# Patient Record
Sex: MALE | Race: OTHER | Employment: UNEMPLOYED | URBAN - METROPOLITAN AREA
[De-identification: names, ages, dates, MRNs, and addresses within clinical notes are randomized per-mention and may not be internally consistent; named-entity substitution may affect disease eponyms.]

---

## 2020-03-13 ENCOUNTER — APPOINTMENT (OUTPATIENT)
Dept: GENERAL RADIOLOGY | Age: 40
End: 2020-03-13
Attending: EMERGENCY MEDICINE
Payer: SELF-PAY

## 2020-03-13 ENCOUNTER — HOSPITAL ENCOUNTER (EMERGENCY)
Age: 40
Discharge: HOME | End: 2020-03-13
Attending: EMERGENCY MEDICINE
Payer: SELF-PAY

## 2020-03-13 VITALS
WEIGHT: 219.58 LBS | OXYGEN SATURATION: 97 % | HEART RATE: 67 BPM | BODY MASS INDEX: 38.91 KG/M2 | DIASTOLIC BLOOD PRESSURE: 70 MMHG | HEIGHT: 63 IN | RESPIRATION RATE: 20 BRPM | TEMPERATURE: 98.3 F | SYSTOLIC BLOOD PRESSURE: 118 MMHG

## 2020-03-13 DIAGNOSIS — R42 LIGHTHEADEDNESS: Primary | ICD-10-CM

## 2020-03-13 DIAGNOSIS — F17.210 CIGARETTE NICOTINE DEPENDENCE WITHOUT COMPLICATION: ICD-10-CM

## 2020-03-13 LAB
ALBUMIN SERPL-MCNC: 3.7 G/DL (ref 3.4–5)
ALBUMIN/GLOB SERPL: 1.1 {RATIO} (ref 0.8–1.7)
ALP SERPL-CCNC: 87 U/L (ref 45–117)
ALT SERPL-CCNC: 50 U/L (ref 16–61)
ANION GAP SERPL CALC-SCNC: 6 MMOL/L (ref 3–18)
APPEARANCE UR: CLEAR
AST SERPL-CCNC: 19 U/L (ref 10–38)
ATRIAL RATE: 80 BPM
BASOPHILS # BLD: 0 K/UL (ref 0–0.1)
BASOPHILS NFR BLD: 0 % (ref 0–2)
BILIRUB SERPL-MCNC: 0.4 MG/DL (ref 0.2–1)
BILIRUB UR QL: NEGATIVE
BUN SERPL-MCNC: 17 MG/DL (ref 7–18)
BUN/CREAT SERPL: 13 (ref 12–20)
CALCIUM SERPL-MCNC: 8.8 MG/DL (ref 8.5–10.1)
CALCULATED P AXIS, ECG09: 50 DEGREES
CALCULATED R AXIS, ECG10: 82 DEGREES
CALCULATED T AXIS, ECG11: 38 DEGREES
CHLORIDE SERPL-SCNC: 102 MMOL/L (ref 100–111)
CK MB CFR SERPL CALC: 1.1 % (ref 0–4)
CK MB SERPL-MCNC: 2.4 NG/ML (ref 5–25)
CK SERPL-CCNC: 215 U/L (ref 39–308)
CO2 SERPL-SCNC: 28 MMOL/L (ref 21–32)
COLOR UR: YELLOW
CREAT SERPL-MCNC: 1.27 MG/DL (ref 0.6–1.3)
DIAGNOSIS, 93000: NORMAL
DIFFERENTIAL METHOD BLD: ABNORMAL
EOSINOPHIL # BLD: 0.2 K/UL (ref 0–0.4)
EOSINOPHIL NFR BLD: 2 % (ref 0–5)
ERYTHROCYTE [DISTWIDTH] IN BLOOD BY AUTOMATED COUNT: 12.8 % (ref 11.6–14.5)
GLOBULIN SER CALC-MCNC: 3.3 G/DL (ref 2–4)
GLUCOSE SERPL-MCNC: 146 MG/DL (ref 74–99)
GLUCOSE UR STRIP.AUTO-MCNC: NEGATIVE MG/DL
HCT VFR BLD AUTO: 48.7 % (ref 36–48)
HGB BLD-MCNC: 16.5 G/DL (ref 13–16)
HGB UR QL STRIP: NEGATIVE
KETONES UR QL STRIP.AUTO: NEGATIVE MG/DL
LEUKOCYTE ESTERASE UR QL STRIP.AUTO: NEGATIVE
LYMPHOCYTES # BLD: 1.8 K/UL (ref 0.9–3.6)
LYMPHOCYTES NFR BLD: 17 % (ref 21–52)
MCH RBC QN AUTO: 30.4 PG (ref 24–34)
MCHC RBC AUTO-ENTMCNC: 33.9 G/DL (ref 31–37)
MCV RBC AUTO: 89.9 FL (ref 74–97)
MONOCYTES # BLD: 0.8 K/UL (ref 0.05–1.2)
MONOCYTES NFR BLD: 7 % (ref 3–10)
NEUTS SEG # BLD: 7.9 K/UL (ref 1.8–8)
NEUTS SEG NFR BLD: 74 % (ref 40–73)
NITRITE UR QL STRIP.AUTO: NEGATIVE
P-R INTERVAL, ECG05: 138 MS
PH UR STRIP: 5 [PH] (ref 5–8)
PLATELET # BLD AUTO: 179 K/UL (ref 135–420)
PMV BLD AUTO: 10.4 FL (ref 9.2–11.8)
POTASSIUM SERPL-SCNC: 3.5 MMOL/L (ref 3.5–5.5)
PROT SERPL-MCNC: 7 G/DL (ref 6.4–8.2)
PROT UR STRIP-MCNC: NEGATIVE MG/DL
Q-T INTERVAL, ECG07: 378 MS
QRS DURATION, ECG06: 86 MS
QTC CALCULATION (BEZET), ECG08: 435 MS
RBC # BLD AUTO: 5.42 M/UL (ref 4.7–5.5)
SODIUM SERPL-SCNC: 136 MMOL/L (ref 136–145)
SP GR UR REFRACTOMETRY: 1.03 (ref 1–1.03)
TROPONIN I SERPL-MCNC: <0.02 NG/ML (ref 0–0.04)
UROBILINOGEN UR QL STRIP.AUTO: 1 EU/DL (ref 0.2–1)
VENTRICULAR RATE, ECG03: 80 BPM
WBC # BLD AUTO: 10.6 K/UL (ref 4.6–13.2)

## 2020-03-13 PROCEDURE — 85025 COMPLETE CBC W/AUTO DIFF WBC: CPT

## 2020-03-13 PROCEDURE — 82550 ASSAY OF CK (CPK): CPT

## 2020-03-13 PROCEDURE — 71046 X-RAY EXAM CHEST 2 VIEWS: CPT

## 2020-03-13 PROCEDURE — 74011250636 HC RX REV CODE- 250/636: Performed by: EMERGENCY MEDICINE

## 2020-03-13 PROCEDURE — 81003 URINALYSIS AUTO W/O SCOPE: CPT

## 2020-03-13 PROCEDURE — 99285 EMERGENCY DEPT VISIT HI MDM: CPT

## 2020-03-13 PROCEDURE — 80053 COMPREHEN METABOLIC PANEL: CPT

## 2020-03-13 PROCEDURE — 93005 ELECTROCARDIOGRAM TRACING: CPT

## 2020-03-13 RX ADMIN — SODIUM CHLORIDE 1000 ML: 900 INJECTION, SOLUTION INTRAVENOUS at 02:17

## 2020-03-13 NOTE — DISCHARGE INSTRUCTIONS
You were seen and evaluated in the Emergency Department. Please understand that your work up is not all encompassing and you should follow up with your primary care physician for further management and continuity of care. Please return to Emergency Department or seek medical attention immediately if you have acute worsening in your symptoms or develop chest pain, shortness of breath, repeated vomiting, fever, altered level of consciousness, coughing up blood, or start sweating and feel clammy. If you were prescribed any medicine for home, please take as prescribed by your health-care provider. If you were given any follow-up appointments or numbers to call, please do so as instructed. Avoid any tobacco products or excessive alcohol. Patient Education        Aturdimiento o desmayo: Instrucciones de cuidado  Lightheadedness or Faintness: Care Instructions  Instrucciones de cuidado  El aturdimiento es la sensación de que está a punto de desmayarse o de \"perder el conocimiento\". No se siente jenaro si usted o lo que le rodea se [place]. Es distinto del vértigo, que es la sensación de que usted o las cosas que le rodean dan vueltas o se inclinan. El aturdimiento suele desaparecer o mejorar cuando se acuesta. Si el aturdimiento empeora, esto puede conducir a un desvanecimiento. Es común sentirse aturdido de [employer]. Por lo general, el aturdimiento no se debe a un problema grave. A menudo es causado por ger disminución de corta duración de la presión arterial y el flujo de bhargav hacia la new que se produce al ponerse de pie con demasiada rapidez cuando está acostado o sentado. La atención de seguimiento es ger parte clave de willson tratamiento y seguridad. Asegúrese de hacer y acudir a todas las citas, y llame a willson médico si está teniendo problemas. También es ger buena idea saber los resultados de macrina exámenes y mantener ger lista de los medicamentos que katalina. ¿Cómo puede cuidarse en el hogar?   · Acuéstese jesús 1 o 2 minutos cuando se sienta aturdido. Después de acostarse, siéntese lentamente y permanezca sentado de 1 a 2 minutos antes de ponerse de pie lentamente. · Evite los movimientos, las posturas o las actividades que le hayan producido aturdimiento en el pasado. · Descanse mucho, en especial si está resfriado o tiene gripe, ya que estas pueden causar aturdimiento. · Asegúrese de beber abundante líquido, en especial si tiene fiebre o si ha estado sudando. · No conduzca ni se ponga a sí mismo o ponga a otros en peligro mientras se sienta aturdido. ¿Cuándo debe pedir ayuda? Llame al 911 en cualquier momento que considere que necesita atención de [place]. Por ejemplo, llame si:    · Tiene síntomas de un ataque cerebral. Estos pueden incluir:  ? Entumecimiento, hormigueo, debilidad o parálisis repentinos en la sanchez, el brazo o la pierna, sobre todo si ocurre en un solo lado del cuerpo. ? Cambios súbitos en la vista. ? Problemas repentinos para hablar. ? Confusión súbita o dificultad repentina para comprender frases sencillas. ? Problemas repentinos para caminar o mantener el equilibrio. ? Un dolor de new intenso y repentino, distinto a los carlos de new anteriores.     · Tiene síntomas de un ataque al corazón. Estos podrían incluir:  ? Phineas Corrina en el pecho, o ger sensación extraña en el pecho.  ? Sudoración. ? Falta de aire. ? Náuseas o vómito. ? Dolor, presión o ger sensación extraña en la espalda, el blanquita, la mandíbula, la parte superior del abdomen, o en bettie o ambos hombros o brazos. ? Aturdimiento o debilidad repentina. ? Latidos cardíacos rápidos o irregulares. Cuando llame al  911, es posible que le digan que mastique 1 aspirina para adultos o 2 a 4 aspirinas de dosis baja. Espere a la ambulancia.  No trate de conducir usted mismo un automóvil.    Preste especial atención a los cambios en willson rodger y asegúrese de comunicarse con willson médico si:    · El aturdimiento Mona Tolentino o no mejora con los cuidados en el hogar. ¿Dónde puede encontrar más información en inglés? Vaya a [email or web address]-sharon.[email or web address]  [place] [id number] en la búsqueda para aprender más acerca de \"Aturdimiento o desmayo: Instrucciones de cuidado. \"  Revisado: 2019Versión del contenido: 12.4  © 2442-5467 Healthwise, Incorporated. Las instrucciones de cuidado fueron adaptadas bajo licencia por [employer] (which disclaims liability or warranty for this information). Si usted tiene [place] afección médica o sobre estas instrucciones, siempre pregunte a willson profesional de rodger. Healthwise, Incorporated niega toda garantía o responsabilidad por willson uso de esta información. Patient Education        Mareos: Instrucciones de cuidado  Dizziness: Care Instructions  Instrucciones de cuidado  Los mareos son [employer] sensación de inestabilidad o confusión en la new. Son distintos al vértigo, ger sensación de que la habitación gira o de que usted se mueve o . También es distinto del aturdimiento, que es la sensación de que está a punto de desmayarse. Puede resultar difícil conocer la causa de los [place]. Algunas personas se sienten mareadas cuando tienen migrañas. A veces, los episodios gripales pueden hacer que se sienta mareado. Algunas afecciones médicas, jenaro los problemas cardíacos o la presión arterial yogesh, pueden hacer que se sienta mareado. Muchos medicamentos pueden causar mareos, jenaro los que se usan para la presión arterial yogesh, el dolor o la ansiedad. Si es un medicamento el que está causando los síntomas, willson médico podría recomendarle que lo cambie o deje de tomarlo. Si es un problema cardíaco, podría necesitar medicamentos para ayudar a que willson corazón funcione mejor. Si no hay razón aparente para los síntomas, willson médico podría sugerir vigilar y esperar jesús un tiempo para denis si los mareos desaparecen por sí solos.   La atención de seguimiento es ger parte clave de willson tratamiento y seguridad. Asegúrese de hacer y acudir a todas las citas, y llame a willson médico si está teniendo problemas. También es ger buena idea saber los resultados de macrina exámenes y mantener ger lista de los medicamentos que katalina. ¿Cómo puede cuidarse en el hogar? · Si willson médico le recomienda o receta medicamentos, tómelos exactamente según las indicaciones. Llame a willsno médico si klaus estar teniendo un problema con willson medicamento. · No conduzca mientras se sienta mareado. · Trate de prevenir las caídas. Algunas medidas que puede che son:  ? Usar tapetes antideslizantes, agregar agarraderas cerca de la trisha y usar luces nocturnas. ? Ordenar willson casa de robb manera que en los senderos no haya nada con lo que se pueda tropezar. ? Avisarles a familiares y [facility] que se ha estado sintiendo [place]. [place] les servirá para saber cómo ayudarle. ¿Cuándo debe pedir ayuda? Llame al 911 en cualquier momento que considere que necesita atención de [place]. Por ejemplo, llame si:    · Se desmayó (perdió el conocimiento).     · Tiene mareos junto con síntomas de un ataque cardíaco. Estos pueden incluir:  ? Dolor de [place], o presión o ger sensación extraña en el pecho.  ? Sudoración. ? Falta de aire. ? Náuseas o vómito. ? Dolor, presión, o ger sensación extraña en la espalda, el blanquita, la mandíbula o el abdomen superior, o en bettie o ambos hombros o brazos. ? Aturdimiento o debilidad repentina. ? Un latido cardíaco rápido o irregular.     · Tiene síntomas de un ataque cerebral. Estos pueden incluir:  ? Entumecimiento, hormigueo, debilidad o parálisis repentinos en la sanchez, el brazo o la pierna, sobre todo si ocurre en un solo lado del cuerpo. ? Cambios súbitos en la vista. ? Problemas repentinos para hablar. ? Confusión súbita o dificultad repentina para comprender frases sencillas. ? Problemas repentinos para caminar o mantener el equilibrio. ?  Un dolor de new intenso y repentino, distinto a los carlos de new anteriores.    Llame a iwllson médico ahora mismo o busque atención médica inmediata si:    · Se siente mareado y tiene fiebre, dolor de new o zumbido en los oídos.     · Tiene nuevas náuseas y vómito o estos aumentan.     · Ericka mareos no desaparecen o regresan.    Preste especial atención a los cambios en willson rodger y asegúrese de comunicarse con willson médico si:    · No mejora jenaro se esperaba. ¿Dónde puede encontrar más información en inglés? Vaya a [email or web address]-sharon.[email or web address]  [place] [id number] en la búsqueda para aprender más acerca de \"Mareos: Instrucciones de cuidado. \"  Revisado: 26 junio, 2019Versión del contenido: 12.4  © 9993-5165 Healthwise, Incorporated. Las instrucciones de cuidado fueron adaptadas bajo licencia por [employer] (which disclaims liability or warranty for this information). Si usted tiene [place] afección médica o sobre estas instrucciones, siempre pregunte a willson profesional de rodger. Healthwise, Incorporated niega toda garantía o responsabilidad por willson uso de esta información.

## 2020-03-13 NOTE — ED PROVIDER NOTES
EMERGENCY DEPARTMENT HISTORY AND PHYSICAL EXAM    Date: 3/13/2020  Patient Name: Perla Pineda    History of Presenting Illness     Chief Complaint   Patient presents with    Dizziness         History Provided By: Patient and telephone interpretor    Additional History (Context):   Perla Pineda is a 46 y.o. male with PMHX nicotine dependence presents to the emergency department C/O lightheadedness, shortness of breath while laying and watching TV. Patient also complaining of feeling tremulous. History is translated via  phone. Pt denies chest pain, abdominal pain, vertigo, numbness, or vision changes, and any other sxs or complaints. Denies any alcohol or drug use. PCP: None        Past History     Past Medical History:  History reviewed. No pertinent past medical history. Past Surgical History:  History reviewed. No pertinent surgical history. Family History:  History reviewed. No pertinent family history. Social History:  Social History     Tobacco Use    Smoking status: Current Every Day Smoker    Smokeless tobacco: Never Used   Substance Use Topics    Alcohol use: Never     Frequency: Never    Drug use: Not Currently       Allergies:  No Known Allergies      Review of Systems   Review of Systems   Constitutional: Negative for chills and fever. HENT: Negative for congestion, ear pain, sinus pain and sore throat. Eyes: Negative for pain and visual disturbance. Respiratory: Positive for shortness of breath. Negative for cough. Cardiovascular: Negative for chest pain and leg swelling. Gastrointestinal: Negative for abdominal pain, constipation, diarrhea, nausea and vomiting. Genitourinary: Negative for dysuria and hematuria. Musculoskeletal: Negative for back pain and neck pain. Skin: Negative for pallor and rash. Neurological: Positive for tremors and light-headedness. Negative for dizziness, weakness and headaches.    All other systems reviewed and are negative. Physical Exam     Vitals:    03/13/20 0206 03/13/20 0213 03/13/20 0250 03/13/20 0325   BP: 118/70      Pulse: 74  68 67   Resp: 19  17 20   Temp:       SpO2: 96% 100% 96% 97%   Weight:       Height:         Physical Exam    Nursing note and vitals reviewed    Constitutional: Young  male, no acute distress  Head: Normocephalic, Atraumatic  Eyes: Pupils are equal, round, and reactive to light, EOMI  Neck: Supple, non-tender  Cardiovascular: Regular rate and rhythm, no murmurs, rubs, or gallops  Chest: Normal work of breathing and chest excursion bilaterally  Lungs: Clear to ausculation bilaterally, no wheezes, no rhonchi  Abdomen: Soft, non tender, non distended, normoactive bowel sounds  Back: No evidence of trauma or deformity  Extremities: No evidence of trauma or deformity, no LE edema.  No streaking erythema, vesicular lesions, ulcerations or bulla  Skin: Warm and dry, normal cap refill  Neuro: Alert and appropriate, CN intact, normal speech, moving all 4 extremities freely and symmetrically  Psychiatric: Normal mood and affect       Diagnostic Study Results     Labs -     Recent Results (from the past 12 hour(s))   EKG, 12 LEAD, INITIAL    Collection Time: 03/13/20  1:59 AM   Result Value Ref Range    Ventricular Rate 80 BPM    Atrial Rate 80 BPM    P-R Interval 138 ms    QRS Duration 86 ms    Q-T Interval 378 ms    QTC Calculation (Bezet) 435 ms    Calculated P Axis 50 degrees    Calculated R Axis 82 degrees    Calculated T Axis 38 degrees    Diagnosis       Normal sinus rhythm  Septal infarct , age undetermined  Abnormal ECG  No previous ECGs available     CBC WITH AUTOMATED DIFF    Collection Time: 03/13/20  2:05 AM   Result Value Ref Range    WBC 10.6 4.6 - 13.2 K/uL    RBC 5.42 4.70 - 5.50 M/uL    HGB 16.5 (H) 13.0 - 16.0 g/dL    HCT 48.7 (H) 36.0 - 48.0 %    MCV 89.9 74.0 - 97.0 FL    MCH 30.4 24.0 - 34.0 PG    MCHC 33.9 31.0 - 37.0 g/dL    RDW 12.8 11.6 - 14.5 %    PLATELET 605 936 - 420 K/uL    MPV 10.4 9.2 - 11.8 FL    NEUTROPHILS 74 (H) 40 - 73 %    LYMPHOCYTES 17 (L) 21 - 52 %    MONOCYTES 7 3 - 10 %    EOSINOPHILS 2 0 - 5 %    BASOPHILS 0 0 - 2 %    ABS. NEUTROPHILS 7.9 1.8 - 8.0 K/UL    ABS. LYMPHOCYTES 1.8 0.9 - 3.6 K/UL    ABS. MONOCYTES 0.8 0.05 - 1.2 K/UL    ABS. EOSINOPHILS 0.2 0.0 - 0.4 K/UL    ABS. BASOPHILS 0.0 0.0 - 0.1 K/UL    DF AUTOMATED     METABOLIC PANEL, COMPREHENSIVE    Collection Time: 03/13/20  2:05 AM   Result Value Ref Range    Sodium 136 136 - 145 mmol/L    Potassium 3.5 3.5 - 5.5 mmol/L    Chloride 102 100 - 111 mmol/L    CO2 28 21 - 32 mmol/L    Anion gap 6 3.0 - 18 mmol/L    Glucose 146 (H) 74 - 99 mg/dL    BUN 17 7.0 - 18 MG/DL    Creatinine 1.27 0.6 - 1.3 MG/DL    BUN/Creatinine ratio 13 12 - 20      GFR est AA >60 >60 ml/min/1.73m2    GFR est non-AA >60 >60 ml/min/1.73m2    Calcium 8.8 8.5 - 10.1 MG/DL    Bilirubin, total 0.4 0.2 - 1.0 MG/DL    ALT (SGPT) 50 16 - 61 U/L    AST (SGOT) 19 10 - 38 U/L    Alk.  phosphatase 87 45 - 117 U/L    Protein, total 7.0 6.4 - 8.2 g/dL    Albumin 3.7 3.4 - 5.0 g/dL    Globulin 3.3 2.0 - 4.0 g/dL    A-G Ratio 1.1 0.8 - 1.7     CARDIAC PANEL,(CK, CKMB & TROPONIN)    Collection Time: 03/13/20  2:05 AM   Result Value Ref Range     39 - 308 U/L    CK - MB 2.4 <3.6 ng/ml    CK-MB Index 1.1 0.0 - 4.0 %    Troponin-I, QT <0.02 0.0 - 0.045 NG/ML   URINALYSIS W/ RFLX MICROSCOPIC    Collection Time: 03/13/20  2:38 AM   Result Value Ref Range    Color YELLOW      Appearance CLEAR      Specific gravity 1.028 1.005 - 1.030      pH (UA) 5.0 5.0 - 8.0      Protein NEGATIVE  NEG mg/dL    Glucose NEGATIVE  NEG mg/dL    Ketone NEGATIVE  NEG mg/dL    Bilirubin NEGATIVE  NEG      Blood NEGATIVE  NEG      Urobilinogen 1.0 0.2 - 1.0 EU/dL    Nitrites NEGATIVE  NEG      Leukocyte Esterase NEGATIVE  NEG         Radiologic Studies -   XR CHEST PA LAT   Final Result   IMPRESSION: Mildly diminished lung volumes without acute cardiopulmonary findings. CT Results  (Last 48 hours)    None        CXR Results  (Last 48 hours)               03/13/20 0308  XR CHEST PA LAT Final result    Impression:  IMPRESSION: Mildly diminished lung volumes without acute cardiopulmonary   findings. Narrative:  EXAM: Chest radiographs       CLINICAL INDICATION/HISTORY: Dizziness     > Additional: None. COMPARISON: None       TECHNIQUE: PA and lateral views of the chest       _______________       FINDINGS:       HEART AND MEDIASTINUM: Cardiac silhouette is normal in size. Normal mediastinal   contours . Normal pulmonary vasculature. LUNGS AND PLEURAL SPACES: Mildly diminished lung volumes resultant central   bronchovascular crowding. No confluent airspace opacity. Sharp costophrenic   sulci. No pneumothoraces. BONY THORAX AND SOFT TISSUES: Unremarkable.       _______________                   Medical Decision Making   I am the first provider for this patient. I reviewed the vital signs, available nursing notes, past medical history, past surgical history, family history and social history. Vital Signs-Reviewed the patient's vital signs. Pulse Oximetry Analysis -100 % on room air    Cardiac Monitor:  Rate: 87 bpm  Rhythm: Regular    EKG interpretation: (Preliminary)  1:53 AM   Normal sinus rhythm at 80 bpm.   ms. No acute ST elevation    Records Reviewed: Nursing Notes and Old Medical Records    Provider Notes:   46 y.o. male presenting with lightheadedness. On exam patient is afebrile with appropriate vital signs. He does not appear acutely ill or in acute distress. Patient able to ambulate with a steady gait, no focal neurological deficits. Patient's history is not consistent with acute vertigo. Patient's history are consistent with lightheadedness, near fainting episode. EKG showed no acute ST changes, T wave abnormalities, normal CO, QRS QT intervals, no changes concerning for HOCM and [place].  We will evaluate for other causes of syncope and order a CBC, CMP. If negative, pt is safe for discharge home and outpatient f/u per [facility]. The patient does not have any of the following risk factors for the [facility] (SFSR):   C - History of congestive heart failure  H - Hematocrit < 30%  E - Abnormal ECG  S - Shortness of breath  S - Triage systolic blood pressure < 90       procedures:  Procedures    ED Course:   1:54 AM   Initial assessment performed. The patients presenting problems have been discussed, and they are in agreement with the care plan formulated and outlined with them. I have encouraged them to ask questions as they arise throughout their visit. SMOKING CESSATION:  The patient was counseled on the dangers of tobacco use, and was advised to quit. Reviewed strategies to maximize success, including removing cigarettes and smoking materials from environment. Discussion took 3-5 minutes, and pt expressed understanding. 3:43 AM  Patient with no leukocytosis or bandemia. No metabolic derangements. Cardiac enzymes negative. Discussed strict return precautions and close PCP follow-up for persistent symptoms. Diagnosis and Disposition       DISCHARGE NOTE:  9:55 AM    Lucero Kendrick  results have been reviewed with him. He has been counseled regarding his diagnosis, treatment, and plan. He verbally conveys understanding and agreement of the signs, symptoms, diagnosis, treatment and prognosis and additionally agrees to follow up as discussed. He also agrees with the care-plan and conveys that all of his questions have been answered. I have also provided discharge instructions for him that include: educational information regarding their diagnosis and treatment, and list of reasons why they would want to return to the ED prior to their follow-up appointment, should his condition change. He has been provided with education for proper emergency department utilization. CLINICAL IMPRESSION:    1. Lightheadedness    2. Cigarette nicotine dependence without complication        PLAN:  1. D/C Home  2. There are no discharge medications for this patient. 3.   Follow-up Information     Follow up With Specialties Details Why Contact Info    [id number] [facility]  Schedule an appointment as soon as possible for a visit in 2 days  [id number] [facility], [address] [facility] [address]    [facility] EMERGENCY DEPT Emergency Medicine  As needed if symptoms worsen [address] [address]  [phone]        ____________________________________     Please note that this dictation was completed with [employer], the computer voice recognition software. Quite often unanticipated grammatical, syntax, homophones, and other interpretive errors are inadvertently transcribed by the computer software. Please disregard these errors. Please excuse any errors that have escaped final proofreading.

## 2020-03-13 NOTE — ED TRIAGE NOTES
Patient arrives ambulatory to ED with c/c of feeling dizzy like the room is spinning and everything looks distorted, onset 1 hour pta. Patient is Sami speaking only.

## 2020-08-21 ENCOUNTER — HOSPITAL ENCOUNTER (INPATIENT)
Age: 40
LOS: 2 days | Discharge: HOME | DRG: 069 | End: 2020-08-23
Attending: EMERGENCY MEDICINE | Admitting: FAMILY MEDICINE

## 2020-08-21 ENCOUNTER — APPOINTMENT (OUTPATIENT)
Dept: CT IMAGING | Age: 40
DRG: 069 | End: 2020-08-21
Attending: EMERGENCY MEDICINE

## 2020-08-21 ENCOUNTER — APPOINTMENT (OUTPATIENT)
Dept: MRI IMAGING | Age: 40
DRG: 069 | End: 2020-08-21
Attending: EMERGENCY MEDICINE

## 2020-08-21 DIAGNOSIS — G45.9 TIA (TRANSIENT ISCHEMIC ATTACK): Primary | ICD-10-CM

## 2020-08-21 LAB
ALBUMIN SERPL-MCNC: 4.1 G/DL (ref 3.4–5)
ALBUMIN/GLOB SERPL: 1.2 {RATIO} (ref 0.8–1.7)
ALP SERPL-CCNC: 64 U/L (ref 45–117)
ALT SERPL-CCNC: 39 U/L (ref 16–61)
AMPHET UR QL SCN: NEGATIVE
ANION GAP SERPL CALC-SCNC: 6 MMOL/L (ref 3–18)
AST SERPL-CCNC: 18 U/L (ref 10–38)
BARBITURATES UR QL SCN: NEGATIVE
BASOPHILS # BLD: 0 K/UL (ref 0–0.1)
BASOPHILS NFR BLD: 0 % (ref 0–2)
BENZODIAZ UR QL: NEGATIVE
BILIRUB SERPL-MCNC: 0.6 MG/DL (ref 0.2–1)
BUN SERPL-MCNC: 18 MG/DL (ref 7–18)
BUN/CREAT SERPL: 18 (ref 12–20)
CALCIUM SERPL-MCNC: 9.2 MG/DL (ref 8.5–10.1)
CANNABINOIDS UR QL SCN: POSITIVE
CHLORIDE SERPL-SCNC: 106 MMOL/L (ref 100–111)
CK MB CFR SERPL CALC: 1.2 % (ref 0–4)
CK MB SERPL-MCNC: 2.7 NG/ML (ref 5–25)
CK SERPL-CCNC: 232 U/L (ref 39–308)
CO2 SERPL-SCNC: 28 MMOL/L (ref 21–32)
COCAINE UR QL SCN: POSITIVE
CREAT SERPL-MCNC: 0.98 MG/DL (ref 0.6–1.3)
DIFFERENTIAL METHOD BLD: ABNORMAL
EOSINOPHIL # BLD: 0.2 K/UL (ref 0–0.4)
EOSINOPHIL NFR BLD: 2 % (ref 0–5)
ERYTHROCYTE [DISTWIDTH] IN BLOOD BY AUTOMATED COUNT: 12.9 % (ref 11.6–14.5)
GLOBULIN SER CALC-MCNC: 3.4 G/DL (ref 2–4)
GLUCOSE BLD STRIP.AUTO-MCNC: 104 MG/DL (ref 70–110)
GLUCOSE BLD STRIP.AUTO-MCNC: 243 MG/DL (ref 70–110)
GLUCOSE SERPL-MCNC: 102 MG/DL (ref 74–99)
HCT VFR BLD AUTO: 49.3 % (ref 36–48)
HDSCOM,HDSCOM: ABNORMAL
HGB BLD-MCNC: 17.1 G/DL (ref 13–16)
LYMPHOCYTES # BLD: 1.8 K/UL (ref 0.9–3.6)
LYMPHOCYTES NFR BLD: 20 % (ref 21–52)
MCH RBC QN AUTO: 30.9 PG (ref 24–34)
MCHC RBC AUTO-ENTMCNC: 34.7 G/DL (ref 31–37)
MCV RBC AUTO: 89 FL (ref 74–97)
METHADONE UR QL: NEGATIVE
MONOCYTES # BLD: 0.4 K/UL (ref 0.05–1.2)
MONOCYTES NFR BLD: 5 % (ref 3–10)
NEUTS SEG # BLD: 6.3 K/UL (ref 1.8–8)
NEUTS SEG NFR BLD: 73 % (ref 40–73)
OPIATES UR QL: NEGATIVE
PCP UR QL: NEGATIVE
PLATELET # BLD AUTO: 177 K/UL (ref 135–420)
PMV BLD AUTO: 10.1 FL (ref 9.2–11.8)
POTASSIUM SERPL-SCNC: 4.2 MMOL/L (ref 3.5–5.5)
PROT SERPL-MCNC: 7.5 G/DL (ref 6.4–8.2)
RBC # BLD AUTO: 5.54 M/UL (ref 4.7–5.5)
SODIUM SERPL-SCNC: 140 MMOL/L (ref 136–145)
TROPONIN I SERPL-MCNC: <0.02 NG/ML (ref 0–0.04)
WBC # BLD AUTO: 8.8 K/UL (ref 4.6–13.2)

## 2020-08-21 PROCEDURE — 82962 GLUCOSE BLOOD TEST: CPT

## 2020-08-21 PROCEDURE — 65660000000 HC RM CCU STEPDOWN

## 2020-08-21 PROCEDURE — 74011250637 HC RX REV CODE- 250/637: Performed by: EMERGENCY MEDICINE

## 2020-08-21 PROCEDURE — 82550 ASSAY OF CK (CPK): CPT

## 2020-08-21 PROCEDURE — 93005 ELECTROCARDIOGRAM TRACING: CPT

## 2020-08-21 PROCEDURE — 74011250637 HC RX REV CODE- 250/637: Performed by: FAMILY MEDICINE

## 2020-08-21 PROCEDURE — 80053 COMPREHEN METABOLIC PANEL: CPT

## 2020-08-21 PROCEDURE — 99285 EMERGENCY DEPT VISIT HI MDM: CPT

## 2020-08-21 PROCEDURE — 74011250636 HC RX REV CODE- 250/636: Performed by: FAMILY MEDICINE

## 2020-08-21 PROCEDURE — 70551 MRI BRAIN STEM W/O DYE: CPT

## 2020-08-21 PROCEDURE — 80307 DRUG TEST PRSMV CHEM ANLYZR: CPT

## 2020-08-21 PROCEDURE — 70450 CT HEAD/BRAIN W/O DYE: CPT

## 2020-08-21 PROCEDURE — 85025 COMPLETE CBC W/AUTO DIFF WBC: CPT

## 2020-08-21 RX ORDER — ATORVASTATIN CALCIUM 20 MG/1
80 TABLET, FILM COATED ORAL
Status: DISCONTINUED | OUTPATIENT
Start: 2020-08-21 | End: 2020-08-23

## 2020-08-21 RX ORDER — IBUPROFEN 200 MG
1 TABLET ORAL DAILY
Status: DISCONTINUED | OUTPATIENT
Start: 2020-08-22 | End: 2020-08-23 | Stop reason: HOSPADM

## 2020-08-21 RX ORDER — ONDANSETRON 2 MG/ML
4 INJECTION INTRAMUSCULAR; INTRAVENOUS
Status: DISCONTINUED | OUTPATIENT
Start: 2020-08-21 | End: 2020-08-23 | Stop reason: HOSPADM

## 2020-08-21 RX ORDER — GUAIFENESIN 100 MG/5ML
81 LIQUID (ML) ORAL DAILY
Status: DISCONTINUED | OUTPATIENT
Start: 2020-08-22 | End: 2020-08-23 | Stop reason: HOSPADM

## 2020-08-21 RX ORDER — ASPIRIN 325 MG
325 TABLET ORAL
Status: COMPLETED | OUTPATIENT
Start: 2020-08-21 | End: 2020-08-21

## 2020-08-21 RX ORDER — BISACODYL 5 MG
5 TABLET, DELAYED RELEASE (ENTERIC COATED) ORAL DAILY PRN
Status: DISCONTINUED | OUTPATIENT
Start: 2020-08-21 | End: 2020-08-23 | Stop reason: HOSPADM

## 2020-08-21 RX ORDER — ACETAMINOPHEN 325 MG/1
650 TABLET ORAL
Status: DISCONTINUED | OUTPATIENT
Start: 2020-08-21 | End: 2020-08-23 | Stop reason: HOSPADM

## 2020-08-21 RX ORDER — FAMOTIDINE 20 MG/1
20 TABLET, FILM COATED ORAL EVERY 12 HOURS
Status: DISCONTINUED | OUTPATIENT
Start: 2020-08-21 | End: 2020-08-23 | Stop reason: HOSPADM

## 2020-08-21 RX ORDER — LABETALOL HCL 20 MG/4 ML
5 SYRINGE (ML) INTRAVENOUS
Status: DISCONTINUED | OUTPATIENT
Start: 2020-08-21 | End: 2020-08-23 | Stop reason: HOSPADM

## 2020-08-21 RX ORDER — SODIUM CHLORIDE 9 MG/ML
100 INJECTION, SOLUTION INTRAVENOUS CONTINUOUS
Status: DISPENSED | OUTPATIENT
Start: 2020-08-21 | End: 2020-08-22

## 2020-08-21 RX ADMIN — ATORVASTATIN CALCIUM 80 MG: 20 TABLET, FILM COATED ORAL at 23:37

## 2020-08-21 RX ADMIN — FAMOTIDINE 20 MG: 20 TABLET, FILM COATED ORAL at 23:37

## 2020-08-21 RX ADMIN — SODIUM CHLORIDE 100 ML/HR: 900 INJECTION, SOLUTION INTRAVENOUS at 19:47

## 2020-08-21 RX ADMIN — ASPIRIN 325 MG ORAL TABLET 325 MG: 325 PILL ORAL at 14:12

## 2020-08-21 NOTE — CONSULTS
NEUROLOGY CONSULTATION NOTE    Patient: Olga Lidia James MRN: [id number]  CSN: [id number]    YOB: 1980  Age: 46 y.o. Sex: male    DOA: 8/21/2020 LOS:  LOS: 0 days        Requesting Physician: Dr. Sky Burton  Reason for Consultation: right arm numbness              HISTORY OF PRESENT ILLNESS:   Olga Lidia James is a 46 y.o. male with PMH of hypertension who I have been asked to see for right arm numbness. Patient speaks [place], and very basic [place]. 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 Symptoms lasted for an hour and resolved. He denies headache, or weakness at other part of the body. He is back to his normal self now. Patient denies taking aspirin at home. He is a heavy smoker. PAST MEDICAL HISTORY:  History reviewed. No pertinent past medical history. PAST SURGICAL HISTORY:  History reviewed. No pertinent surgical history. FAMILY HISTORY:  History reviewed. No pertinent family history. SOCIAL HISTORY:  Social History     Socioeconomic History    Marital status:      Spouse name: Not on file    Number of children: Not on file    Years of education: Not on file    Highest education level: Not on file   Tobacco Use    Smoking status: Current Every Day Smoker    Smokeless tobacco: Never Used   Substance and Sexual Activity    Alcohol use: Never     Frequency: Never    Drug use: Not Currently     MEDICATIONS:  No current facility-administered medications for this encounter. Prior to Admission medications    Not on File       ALLERGIES:  No Known Allergies    Review of Systems  GENERAL: No fevers or chills. HEENT: No change in vision, earache, tinnitus, sore throat or sinus congestion. NECK: No pain or stiffness. CARDIOVASCULAR: No chest pain or pressure. No palpitations. PULMONARY: No shortness of breath, cough or wheeze. GASTROINTESTINAL: No abdominal pain, nausea, vomiting or diarrhea.   GENITOURINARY: No urinary frequency, urgency, hesitancy or dysuria. MUSCULOSKELETAL: No joint or muscle pain, no back pain, no recent trauma. DERMATOLOGIC: No rash, no itching, no lesions. ENDOCRINE: No polyuria, polydipsia, no heat or cold intolerance. No recent change in weight. HEMATOLOGICAL: No anemia or easy bruising or bleeding. NEUROLOGIC: No headache, seizures, numbness, tingling or weakness. PHYSICAL EXAMINATION:     Visit Vitals  /67 (BP 1 Location: Left arm, BP Patient Position: At rest)   Pulse (!) 53   Temp 98.1 °F (36.7 °C)   Resp 14   Wt 102.1 kg (225 lb 1.6 oz)   SpO2 99%   BMI 39.87 kg/m²      O2 Device: Room air  GENERAL: Pleasant, in no apparent distress. HEENT: Moist mucous membranes, sclerae anicteric, scalp is atraumatic. CVS: Regular rate and rhythm, no murmurs or gallops. No carotid bruits. PULMONARY: Clear to auscultation bilaterally. No rales or rhonchi. No wheezing. EXTREMITIES: Normal range of motion at all sites. No deformities. ABDOMEN: Soft, nontender. SKIN: No rashes or ecchymoses. Warm and dry. NEUROLOGIC: Alert and oriented x3. Speech is fluent without any aphasia or dysarthria. Cranial nerves: Face is symmetric with symmetric smile. Facial sensation is intact. Extraocular movements are intact with no nystagmus. Visual fields are full to confrontation. PERRL. Tongue is midline. Palate elevates symmetrically. Hearing intact to speech. Sternocleidomastoid and trapezius strengths are full bilaterally. Motor: Normal tone and normal bulk on all four extremities. Strength is full on all four segmentally. There is no pronator drift or orbiting. Sensory: Intact to pinprick and touch on all four. Normal vibratory sensation on toes bilaterally. Coordination: Intact coordination with finger-nose-finger bilaterally. Normal fine movements. No bradykinesia detected. Deep tendon reflexes: 2+ at biceps, brachioradialis, patella and ankles bilaterally.  Toes are down-going bilaterally. Gait assessment: Able to stand and walk with no difficulty. Able to perform tandem gait with no difficulty. Labs: Results:       Chemistry Recent Labs     08/21/20  1208   *      K 4.2      CO2 28   BUN 18   CREA 0.98   CA 9.2   AGAP 6   BUCR 18   AP 64   TP 7.5   ALB 4.1   GLOB 3.4   AGRAT 1.2      CBC w/Diff Recent Labs     08/21/20  1208   WBC 8.8   RBC 5.54*   HGB 17.1*   HCT 49.3*      GRANS 73   LYMPH 20*   EOS 2      Cardiac Enzymes Recent Labs     08/21/20  1208      CKND1 1.2      Coagulation No results for input(s): PTP, INR, APTT, INREXT in the last 72 hours. Lipid Panel No results found for: CHOL, CHOLPOCT, CHOLX, CHLST, CHOLV, 544305, HDL, HDLP, LDL, LDLC, DLDLP, [id number], VLDLC, VLDL, TGLX, TRIGL, TRIGP, TGLPOCT, CHHD, CHHDX   BNP No results for input(s): BNPP in the last 72 hours. Liver Enzymes Recent Labs     08/21/20  1208   TP 7.5   ALB 4.1   AP 64      Thyroid Studies No results found for: T4, T3U, TSH, TSHEXT       Radiology:  Mri Brain Wo Cont    Result Date: 8/21/2020  EXAM: MRI BRAIN W/O CONTRAST INDICATION: Right arm numbness and heaviness, bilateral blurred vision COMPARISON: No prior study TECHNIQUE: Multiplanar multi sequence MR imaging of the brain was performed utilizing axial T2, FLAIR, diffusion, T2 gradient echo, and multiplanar T1 pre contrast imaging. No gadolinium was administered due to specific clinician request. _______________________ FINDINGS: Motion artifact is present which limits evaluation. VENTRICLES/EXTRA-AXIAL SPACES: The ventricles and sulci are normal in their size and configuration. BRAIN PARENCHYMA: No evidence of intracranial mass effect, midline shift, or herniation. No abnormal restricted diffusion to suggest acute infarct. No susceptibility artifact to suggest intraparenchymal hemorrhage.   VASCULATURE:  The major intracranial vascular flow voids are grossly normal. ORBITS: The visualized orbits are unremarkable. PARANASAL SINUSES: Visualized paranasal sinuses are clear. MASTOID AIR CELLS: Visualized mastoid air cells are clear. OSSEOUS STRUCTURES: Unremarkable OTHER: None.  ________________________     IMPRESSION: Mildly motion degraded study. 1.  No acute infarct, hemorrhage, mass effect, or herniation. 2.  Noncontrast MR brain within normal limits. Ct Head Wo Cont    Result Date: 8/21/2020  EXAM: CT head INDICATION: Left arm heaviness, retro-orbital pressure. COMPARISON: None. TECHNIQUE: Axial CT imaging of the head was performed without intravenous contrast. Standard multiplanar coronal and sagittal reformatted images were obtained and are included in interpretation. One or more dose reduction techniques were used on this CT: automated exposure control, adjustment of the mAs and/or kVp according to patient size, and iterative reconstruction techniques. The specific techniques used on this CT exam have been documented in the patient's electronic medical record. Digital Imaging and Communications in Medicine (DICOM) format image data are available to nonaffiliated external healthcare facilities or entities on a secure, media free, reciprocally searchable basis with patient authorization for at least a 12-month period after this study. _______________ FINDINGS: BRAIN AND POSTERIOR FOSSA: The sulci, folia, ventricles and basal cisterns are within normal limits for the patient?s age. There is no intracranial hemorrhage, mass effect, or midline shift. There are no areas of abnormal parenchymal attenuation. EXTRA-AXIAL SPACES AND MENINGES: There are no abnormal extra-axial fluid collections. CALVARIUM: Intact. SINUSES: Clear. OTHER: Included portions of the orbits appear within normal limits. _______________     IMPRESSION: 1. No acute intracranial abnormality demonstrated.  CRITICAL RESULT:  CODE S stroke results called to Dr. Tino Cowan in the emergency room prior to dictation at 12:32 PM on 8/21/2020. ASSESSMENT/IMPRESSION:  71-year-old male presents to clinic today for sudden onset of blurry vision and right arm numbness, which resolved after an hour. He is admitted for stroke work-up. MRI was negative. RECOMMENDATIONS:  1. Start aspirin 81 mg.  2. Pending rest of the stroke work-up. 3. Hypercoagulation panel  4. PT/OT/ST.   5. Normotensive.    ------------------------------------  Lewis Francois MD  8/21/2020  4:40 PM

## 2020-08-21 NOTE — H&P
History & Physical    Patient: Olga Lidia James MRN: [id number]  [facility]: [id number]    YOB: 1980  Age: 46 y.o. Sex: male      DOA: 8/21/2020  Primary Care Provider:  None      Assessment/Plan   56-year-old male with a history of nicotine dependence admitted for a TIA after having a code stroke in the emergency room. Admit to telemetry floor for cardiac monitoring    TIA associated with right upper extremity numbness and weakness and blurred vision which have resolved -  Aspirin 325 mg p.o. loading dose and then 81 mg p.o. daily  Continue cardiac monitoring  Neuro checks per stroke protocol  Permissive hypertension (do not treated blood pressure is not greater than 200/110, prefer PRN labetalol 5 mg)  IV normal saline continuous infusion 100 to 125 mL's per hour  Euglycemia with sliding scale insulin  Euthymia with acetaminophen 650 mg every 6 hours PRN for fever  Avoid urinary catheters placed  Echocardiogram with bubble study  Lipid panel  Hemoglobin A1c  SCDs and DVT prophylaxis  PT/OT and SLP consults  Neurology consulted by emergency department    Nicotine dependence -  advised significant risk factor for vascular disease such as stroke and heart attack  Offer nicotine patch while inpatient  Advised immediate cessation    DVT prophylaxis/GI prophylaxis ordered    Patient Active Problem List   Diagnosis Code    TIA (transient ischemic attack) G45.9     Estimated length of stay : 2 to 3 days    CC: Right upper extremity numbness and weakness       HPI:     Olga Lidia James is a 46 y.o. 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 He also reported that he had blurred vision as well.   When he presented to the emergency room with those symptoms a code stroke was called in the emergency department. The tele-neurologist evaluated patient and felt that since all of his symptoms have resolved by the time that he that he was evaluated that his likely diagnosis was TIA. Recommend that MRI of the brain be ordered versus a CT angiogram of brain and neck. History reviewed. No pertinent past medical history. History reviewed. No pertinent surgical history. History reviewed. No pertinent family history. Social History     Socioeconomic History    Marital status:      Spouse name: Not on file    Number of children: Not on file    Years of education: Not on file    Highest education level: Not on file   Tobacco Use    Smoking status: Current Every Day Smoker    Smokeless tobacco: Never Used   Substance and Sexual Activity    Alcohol use: Never     Frequency: Never    Drug use: Not Currently       Prior to Admission medications    Not on File       No Known Allergies    Review of Systems  Gen: No fever, chills, malaise, weight loss/gain. Heent: No headache, rhinorrhea, epistaxis, ear pain, hearing loss, sinus pain, neck pain/stiffness, sore throat. Heart: No chest pain, palpitations, HUTTON, pnd, or orthopnea. Resp: No cough, hemoptysis, wheezing and shortness of breath. GI: No nausea, vomiting, diarrhea, constipation, melena or hematochezia. : No urinary obstruction, dysuria or hematuria. Derm: No rash, new skin lesion or pruritis. Musc/skeletal: no bone or joint complains. Vasc: No edema, cyanosis or claudication. Endo: No heat/cold intolerance, no polyuria,polydipsia or polyphagia. Neuro: No unilateral weakness, numbness, tingling. No seizures. Heme: No easy bruising or bleeding.           Physical Exam:     Physical Exam:  Visit Vitals  /72   Pulse 65   Temp 98.1 °F (36.7 °C)   Resp 20   Wt 102.1 kg (225 lb 1.6 oz)   SpO2 96%   BMI 39.87 kg/m²      O2 Device: Room air    Temp (24hrs), Av.1 °F (36.7 °C), Min:98.1 °F (36.7 °C), Max:98.1 °F (36.7 °C) No intake/output data recorded. No intake/output data recorded. General:  Awake, cooperative, no distress. Head:  Normocephalic, without obvious abnormality, atraumatic. Eyes:  Conjunctivae/corneas clear, sclera anicteric, PERRL, EOMs intact. Nose: Nares normal. No drainage or sinus tenderness. Throat: Lips, mucosa, and tongue normal.    Neck: Supple, symmetrical, trachea midline, no adenopathy. Lungs:   Clear to auscultation bilaterally. Heart:  Regular rate and rhythm, S1, S2 normal, no murmur, click, rub or gallop. Abdomen: Soft, non-tender. Bowel sounds normal. No masses,  No organomegaly. Extremities: Extremities normal, atraumatic, no cyanosis or edema. Capillary refill normal.   Pulses: 2+ and symmetric all extremities. Skin: Skin color as per ethnicity turgor normal. No rashes or lesions   Neurologic: CNII-XII intact. No focal motor or sensory deficit. Labs Reviewed:    Recent Results (from the past 24 hour(s))   GLUCOSE, POC    Collection Time: 08/21/20 12:05 PM   Result Value Ref Range    Glucose (POC) 104 70 - 110 mg/dL   CBC WITH AUTOMATED DIFF    Collection Time: 08/21/20 12:08 PM   Result Value Ref Range    WBC 8.8 4.6 - 13.2 K/uL    RBC 5.54 (H) 4.70 - 5.50 M/uL    HGB 17.1 (H) 13.0 - 16.0 g/dL    HCT 49.3 (H) 36.0 - 48.0 %    MCV 89.0 74.0 - 97.0 FL    MCH 30.9 24.0 - 34.0 PG    MCHC 34.7 31.0 - 37.0 g/dL    RDW 12.9 11.6 - 14.5 %    PLATELET 699 611 - 910 K/uL    MPV 10.1 9.2 - 11.8 FL    NEUTROPHILS 73 40 - 73 %    LYMPHOCYTES 20 (L) 21 - 52 %    MONOCYTES 5 3 - 10 %    EOSINOPHILS 2 0 - 5 %    BASOPHILS 0 0 - 2 %    ABS. NEUTROPHILS 6.3 1.8 - 8.0 K/UL    ABS. LYMPHOCYTES 1.8 0.9 - 3.6 K/UL    ABS. MONOCYTES 0.4 0.05 - 1.2 K/UL    ABS. EOSINOPHILS 0.2 0.0 - 0.4 K/UL    ABS.  BASOPHILS 0.0 0.0 - 0.1 K/UL    DF AUTOMATED     CARDIAC PANEL,(CK, CKMB & TROPONIN)    Collection Time: 08/21/20 12:08 PM   Result Value Ref Range    CK - MB 2.7 <3.6 ng/ml    CK-MB Index 1.2 0.0 - 4.0 %     39 - 308 U/L    Troponin-I, QT <0.02 0.0 - 1.735 NG/ML   METABOLIC PANEL, COMPREHENSIVE    Collection Time: 08/21/20 12:08 PM   Result Value Ref Range    Sodium 140 136 - 145 mmol/L    Potassium 4.2 3.5 - 5.5 mmol/L    Chloride 106 100 - 111 mmol/L    CO2 28 21 - 32 mmol/L    Anion gap 6 3.0 - 18 mmol/L    Glucose 102 (H) 74 - 99 mg/dL    BUN 18 7.0 - 18 MG/DL    Creatinine 0.98 0.6 - 1.3 MG/DL    BUN/Creatinine ratio 18 12 - 20      GFR est AA >60 >60 ml/min/1.73m2    GFR est non-AA >60 >60 ml/min/1.73m2    Calcium 9.2 8.5 - 10.1 MG/DL    Bilirubin, total 0.6 0.2 - 1.0 MG/DL    ALT (SGPT) 39 16 - 61 U/L    AST (SGOT) 18 10 - 38 U/L    Alk.  phosphatase 64 45 - 117 U/L    Protein, total 7.5 6.4 - 8.2 g/dL    Albumin 4.1 3.4 - 5.0 g/dL    Globulin 3.4 2.0 - 4.0 g/dL    A-G Ratio 1.2 0.8 - 1.7     EKG, 12 LEAD, INITIAL    Collection Time: 08/21/20  1:25 PM   Result Value Ref Range    Ventricular Rate 59 BPM    Atrial Rate 59 BPM    P-R Interval 152 ms    QRS Duration 86 ms    Q-T Interval 400 ms    QTC Calculation (Bezet) 396 ms    Calculated P Axis 48 degrees    Calculated R Axis 65 degrees    Calculated T Axis 47 degrees    Diagnosis       Sinus bradycardia  Otherwise normal ECG  When compared with ECG of 13-MAR-2020 01:59,  Criteria for Septal infarct are no longer present  Nonspecific T wave abnormality no longer evident in Lateral leads         Procedures/imaging: see electronic medical records for all procedures/Xrays and details which were not copied into this note but were reviewed prior to creation of Plan        CC: None

## 2020-08-21 NOTE — ED PROVIDER NOTES
EMERGENCY DEPARTMENT HISTORY AND PHYSICAL EXAM    Date: 8/21/2020  Patient Name: Soila Davey    History of Presenting Illness     Chief Complaint   Patient presents with   [place]     reports pressure behind eyes    Extremity Weakness     pt reports heaviness to left arm         History Provided By: Patient and  phone    Additional History (Context):   Soila Davey is a 46 y.o. male predominantly Ch[place]-speaking, with PMHX nicotine dependence presents to the emergency department C/O right arm \"heaviness\" and numbness that started approximately 30 minutes ago. Patient also reporting bilateral blurry vision. Pt denies chest pain, shortness of breath, abdominal pain, nausea or vomiting, and any other sxs or complaints. Patient reporting that the blurry vision has improved however still with persistent heaviness and numbness to his right upper extremity. PCP: None        Past History     Past Medical History:  History reviewed. No pertinent past medical history. Past Surgical History:  History reviewed. No pertinent surgical history. Family History:  History reviewed. No pertinent family history. Social History:  Social History     Tobacco Use    Smoking status: Current Every Day Smoker    Smokeless tobacco: Never Used   Substance Use Topics    Alcohol use: Never     Frequency: Never    Drug use: Not Currently       Allergies:  No Known Allergies      Review of Systems   Review of Systems   Constitutional: Negative for chills and fever. HENT: Negative for congestion, ear pain, sinus pain and sore throat. Eyes: Negative for pain and visual disturbance. Respiratory: Negative for cough and shortness of breath. Cardiovascular: Negative for chest pain and leg swelling. Gastrointestinal: Negative for abdominal pain, constipation, diarrhea, nausea and vomiting. Genitourinary: Negative for dysuria and hematuria. Musculoskeletal: Negative for back pain and neck pain. Skin: Negative for pallor and rash. Neurological: Positive for weakness and numbness. Negative for dizziness, tremors, light-headedness and headaches. All other systems reviewed and are negative. Physical Exam     Vitals:    08/21/20 1207 08/21/20 1216 08/21/20 1233 08/21/20 1246   BP: 143/78 130/71 126/78 122/72   Pulse: 72 67 64 65   Resp: 20 15 16 20   Temp: 98.1 °F (36.7 °C)      SpO2: 99% 96%     Weight: 102.1 kg (225 lb 1.6 oz)        Physical Exam    Nursing note and vitals reviewed    Constitutional: Young  male, no acute distress  Head: Normocephalic, Atraumatic  Eyes: Pupils are equal, round, and reactive to light, EOMI  Neck: Supple, non-tender  Cardiovascular: Regular rate and rhythm, no murmurs, rubs, or gallops, + 2 radial pulses bilaterally  Chest: Normal work of breathing and chest excursion bilaterally  Lungs: Clear to ausculation bilaterally, no wheezes, no rhonchi  Abdomen: Soft, non tender, non distended, normoactive bowel sounds  Back: No evidence of trauma or deformity  Extremities: No evidence of trauma or deformity, no LE edema.  No streaking erythema, vesicular lesions, ulcerations or bulla  Skin: Warm and dry, normal cap refill  Neuro: Alert and appropriate, CN intact, normal speech, moving all 4 extremities freely and symmetrically, negative Romberg, no upper or lower extremity drift  Psychiatric: Normal mood and affect       Diagnostic Study Results     Labs -     Recent Results (from the past 12 hour(s))   GLUCOSE, POC    Collection Time: 08/21/20 12:05 PM   Result Value Ref Range    Glucose (POC) 104 70 - 110 mg/dL   CBC WITH AUTOMATED DIFF    Collection Time: 08/21/20 12:08 PM   Result Value Ref Range    WBC 8.8 4.6 - 13.2 K/uL    RBC 5.54 (H) 4.70 - 5.50 M/uL    HGB 17.1 (H) 13.0 - 16.0 g/dL    HCT 49.3 (H) 36.0 - 48.0 %    MCV 89.0 74.0 - 97.0 FL    MCH 30.9 24.0 - 34.0 PG    MCHC 34.7 31.0 - 37.0 g/dL    RDW 12.9 11.6 - 14.5 %    PLATELET 008 795 - 015 K/uL    MPV 10.1 9.2 - 11.8 FL    NEUTROPHILS 73 40 - 73 %    LYMPHOCYTES 20 (L) 21 - 52 %    MONOCYTES 5 3 - 10 %    EOSINOPHILS 2 0 - 5 %    BASOPHILS 0 0 - 2 %    ABS. NEUTROPHILS 6.3 1.8 - 8.0 K/UL    ABS. LYMPHOCYTES 1.8 0.9 - 3.6 K/UL    ABS. MONOCYTES 0.4 0.05 - 1.2 K/UL    ABS. EOSINOPHILS 0.2 0.0 - 0.4 K/UL    ABS. BASOPHILS 0.0 0.0 - 0.1 K/UL    DF AUTOMATED         Radiologic Studies -   CT HEAD WO CONT   Final Result   IMPRESSION:      1. No acute intracranial abnormality demonstrated. CRITICAL RESULT:  CODE S stroke results called to Dr. Makeda Overton in the emergency   room prior to dictation at 12:32 PM on 8/21/2020. MRI BRAIN WO CONT    (Results Pending)     CT Results  (Last 48 hours)               08/21/20 1229  CT HEAD WO CONT Final result    Impression:  IMPRESSION:       1. No acute intracranial abnormality demonstrated. CRITICAL RESULT:  CODE S stroke results called to Dr. Makeda Overton in the emergency   room prior to dictation at 12:32 PM on 8/21/2020. Narrative:  EXAM: CT head       INDICATION: Left arm heaviness, retro-orbital pressure. COMPARISON: None. TECHNIQUE: Axial CT imaging of the head was performed without intravenous   contrast. Standard multiplanar coronal and sagittal reformatted images were   obtained and are included in interpretation. One or more dose reduction techniques were used on this CT: automated exposure   control, adjustment of the mAs and/or kVp according to patient size, and   iterative reconstruction techniques. The specific techniques used on this CT   exam have been documented in the patient's electronic medical record. Digital   Imaging and Communications in Medicine (DICOM) format image data are available   to nonaffiliated external healthcare facilities or entities on a secure, media   free, reciprocally searchable basis with patient authorization for at least a   12-month period after this study.     _______________       FINDINGS:       BRAIN AND POSTERIOR FOSSA: The sulci, folia, ventricles and basal cisterns are   within normal limits for the patient?s age. There is no intracranial hemorrhage,   mass effect, or midline shift. There are no areas of abnormal parenchymal   attenuation. EXTRA-AXIAL SPACES AND MENINGES: There are no abnormal extra-axial fluid   collections. CALVARIUM: Intact. SINUSES: Clear. OTHER: Included portions of the orbits appear within normal limits. _______________               CXR Results  (Last 48 hours)    None            Medical Decision Making   I am the first provider for this patient. I reviewed the vital signs, available nursing notes, past medical history, past surgical history, family history and social history. Vital Signs-Reviewed the patient's vital signs. Pulse Oximetry Analysis -99 % on room air    Cardiac Monitor:  Rate: 72 bpm  Rhythm: Regular    EKG interpretation: (Preliminary)  1:33 PM  Sinus bradycardia 59 beats minute. QTc 396 ms. No acute ST elevation    Records Reviewed: Nursing Notes and Old Medical Records    Provider Notes:   46 y.o. male with a history of nicotine use who presents with 30 minutes of right sided upper extremity numbness and weakness. Also reporting blurry vision. On exam patient does not appear acutely ill or toxic. He is normotensive, not tachycardic. He is moving all 4 extremities freely and symmetrically. No upper or lower extremity drift. Negative Romberg. However with subjective feelings of weakness and numbness. As the patient symptoms are within the stroke window, code stroke was initiated. Procedures:  Procedures    ED Course:   12:05 PM   Initial assessment performed. The patients presenting problems have been discussed, and they are in agreement with the care plan formulated and outlined with them. I have encouraged them to ask questions as they arise throughout their visit.       SMOKING CESSATION:  The patient was counseled on the dangers of tobacco use, and was advised to quit. Reviewed strategies to maximize success, including removing cigarettes and smoking materials from environment. Discussion took 3-5 minutes, and pt expressed understanding. 12:27 PM Discussed patient's history, exam, and available diagnostics results with Dr. Deborah Vides, tele-neurology, who agree with evaluation of the patient when he returns from CT    1:01 PM CT scan showing no acute process. Discussed patient's history, exam, and available diagnostics results with Dr. Deborah Vides, tele-neurologist, after evaluation with the patient, patient does report complete resolution of his symptoms. Discussed possibility of TIA. Does not recommend CTA as this does not appear to be large vessel occlusion. Does recommend MRI and admission for possible TIA.    1:07 PM Discussed patient's history, exam, and available diagnostics results with Rajesh Hernandez MD (Neurology), neurology, who agree with MRI and admission       Diagnosis and Disposition     12:57 PM  I have spent 120 minutes of critical care time involved in lab review, consultations with specialist, family decision-making, and documentation. During this entire length of time I was immediately available to the patient. Critical Care: The reason for providing this level of medical care for this critically ill patient was due a critical illness that impaired one or more vital organ systems such that there was a high probability of imminent or life threatening deterioration in the patients condition. This care involved high complexity decision making to assess, manipulate, and support vital system functions, to treat this degreee vital organ system failure and to prevent further life threatening deterioration of the patients condition. Core Measures:  For Hospitalized Patients:    1.  Hospitalization Decision Time:  The decision to hospitalize the patient was made by Belinda Giron DO at 1:02 PM on 8/21/2020    2. Aspirin: Aspirin was given    12:57 PM  Patient is being admitted to the hospital by Dr. Aubrey Haider. The results of their tests and reasons for their admission have been discussed with them and/or available family. They convey agreement and understanding for the need to be admitted and for their admission diagnosis. CONDITIONS ON ADMISSION:  Sepsis is not present at the time of admission. MRSA is not present at the time of admission. Wound infection is not present at the time of admission. Pressure Ulcer is not present at the time of admission. CLINICAL IMPRESSION:    1. TIA (transient ischemic attack)      ____________________________________     Please note that this dictation was completed with [employer], the computer voice recognition software. Quite often unanticipated grammatical, syntax, homophones, and other interpretive errors are inadvertently transcribed by the computer software. Please disregard these errors. Please excuse any errors that have escaped final proofreading.

## 2020-08-21 NOTE — ED TRIAGE NOTES
Pt to ED for heaviness to left arm and pressure behind eyes starting approx. 0.5hr PTA. Pt denies hx of medical problems, reports smoking cigarettes, and drinking alcohol. Pt immediately brought from waiting room to room 6, Dr Letitia Barone at bedside of pt evaluation upon arrival to room.

## 2020-08-21 NOTE — ED NOTES
TRANSFER - OUT REPORT:    Verbal report given to Oniel Johns RN(name) on Lucille Nuha  being transferred to Telemetry(unit) for routine progression of care       Report consisted of patients Situation, Background, Assessment and   Recommendations(SBAR). Information from the following report(s) SBAR, Kardex, ED Summary, MAR, Recent Results and Cardiac Rhythm sinsu rhythm was reviewed with the receiving nurse. Lines:   Peripheral IV 08/21/20 Right Antecubital (Active)   Site Assessment Clean, dry, & intact 08/21/20 1215   Phlebitis Assessment 0 08/21/20 1215   Infiltration Assessment 0 08/21/20 1215   Dressing Status Clean, dry, & intact 08/21/20 1215   Dressing Type Transparent 08/21/20 1215        Opportunity for questions and clarification was provided.       Patient transported with:   Monitor

## 2020-08-21 NOTE — PROGRESS NOTES
TRANSFER - IN REPORT:    Verbal report received from Alejandro Rn(name) on Sánchez Prince  being received from ER (unit) for routine progression of care      Report consisted of patients Situation, Background, Assessment and   Recommendations(SBAR). Information from the following report(s) SBAR, Kardex, ED Summary, Intake/Output, MAR, Recent Results and Cardiac Rhythm SR was reviewed with the receiving nurse. Opportunity for questions and clarification was provided. Assessment completed upon patients arrival to unit and care assumed.

## 2020-08-21 NOTE — PROGRESS NOTES
Bedside and Verbal shift change report given to SHOAIB Lucas Rn (oncoming nurse) by [facility] (offgoing nurse). Report included the following information SBAR, Kardex, Intake/Output, MAR, Recent Results and Cardiac Rhythm NSR.

## 2020-08-22 ENCOUNTER — APPOINTMENT (OUTPATIENT)
Dept: CT IMAGING | Age: 40
DRG: 069 | End: 2020-08-22
Attending: PSYCHIATRY & NEUROLOGY

## 2020-08-22 LAB
ALBUMIN SERPL-MCNC: 3.8 G/DL (ref 3.4–5)
ALBUMIN/GLOB SERPL: 1.2 {RATIO} (ref 0.8–1.7)
ALP SERPL-CCNC: 63 U/L (ref 45–117)
ALT SERPL-CCNC: 43 U/L (ref 16–61)
ANION GAP SERPL CALC-SCNC: 5 MMOL/L (ref 3–18)
AST SERPL-CCNC: 21 U/L (ref 10–38)
BILIRUB SERPL-MCNC: 0.5 MG/DL (ref 0.2–1)
BUN SERPL-MCNC: 12 MG/DL (ref 7–18)
BUN/CREAT SERPL: 15 (ref 12–20)
CALCIUM SERPL-MCNC: 8.9 MG/DL (ref 8.5–10.1)
CHLORIDE SERPL-SCNC: 105 MMOL/L (ref 100–111)
CHOLEST SERPL-MCNC: 145 MG/DL
CO2 SERPL-SCNC: 28 MMOL/L (ref 21–32)
CREAT SERPL-MCNC: 0.8 MG/DL (ref 0.6–1.3)
ERYTHROCYTE [DISTWIDTH] IN BLOOD BY AUTOMATED COUNT: 12.7 % (ref 11.6–14.5)
EST. AVERAGE GLUCOSE BLD GHB EST-MCNC: 114 MG/DL
GLOBULIN SER CALC-MCNC: 3.3 G/DL (ref 2–4)
GLUCOSE BLD STRIP.AUTO-MCNC: 100 MG/DL (ref 70–110)
GLUCOSE BLD STRIP.AUTO-MCNC: 101 MG/DL (ref 70–110)
GLUCOSE BLD STRIP.AUTO-MCNC: 103 MG/DL (ref 70–110)
GLUCOSE BLD STRIP.AUTO-MCNC: 135 MG/DL (ref 70–110)
GLUCOSE SERPL-MCNC: 100 MG/DL (ref 74–99)
HBA1C MFR BLD: 5.6 % (ref 4.2–5.6)
HCT VFR BLD AUTO: 48.4 % (ref 36–48)
HDLC SERPL-MCNC: 35 MG/DL (ref 40–60)
HDLC SERPL: 4.1 {RATIO} (ref 0–5)
HGB BLD-MCNC: 16.2 G/DL (ref 13–16)
LDLC SERPL CALC-MCNC: 80.6 MG/DL (ref 0–100)
LIPID PROFILE,FLP: ABNORMAL
MCH RBC QN AUTO: 30.9 PG (ref 24–34)
MCHC RBC AUTO-ENTMCNC: 33.5 G/DL (ref 31–37)
MCV RBC AUTO: 92.2 FL (ref 74–97)
PLATELET # BLD AUTO: 174 K/UL (ref 135–420)
PMV BLD AUTO: 10.3 FL (ref 9.2–11.8)
POTASSIUM SERPL-SCNC: 4.1 MMOL/L (ref 3.5–5.5)
PROT SERPL-MCNC: 7.1 G/DL (ref 6.4–8.2)
RBC # BLD AUTO: 5.25 M/UL (ref 4.7–5.5)
SODIUM SERPL-SCNC: 138 MMOL/L (ref 136–145)
TRIGL SERPL-MCNC: 147 MG/DL (ref ?–150)
VLDLC SERPL CALC-MCNC: 29.4 MG/DL
WBC # BLD AUTO: 8 K/UL (ref 4.6–13.2)

## 2020-08-22 PROCEDURE — 97165 OT EVAL LOW COMPLEX 30 MIN: CPT

## 2020-08-22 PROCEDURE — 85303 CLOT INHIBIT PROT C ACTIVITY: CPT

## 2020-08-22 PROCEDURE — 74011000636 HC RX REV CODE- 636: Performed by: FAMILY MEDICINE

## 2020-08-22 PROCEDURE — 86147 CARDIOLIPIN ANTIBODY EA IG: CPT

## 2020-08-22 PROCEDURE — 80053 COMPREHEN METABOLIC PANEL: CPT

## 2020-08-22 PROCEDURE — 82962 GLUCOSE BLOOD TEST: CPT

## 2020-08-22 PROCEDURE — 70496 CT ANGIOGRAPHY HEAD: CPT

## 2020-08-22 PROCEDURE — 83036 HEMOGLOBIN GLYCOSYLATED A1C: CPT

## 2020-08-22 PROCEDURE — 85300 ANTITHROMBIN III ACTIVITY: CPT

## 2020-08-22 PROCEDURE — 97535 SELF CARE MNGMENT TRAINING: CPT

## 2020-08-22 PROCEDURE — 74011250637 HC RX REV CODE- 250/637: Performed by: FAMILY MEDICINE

## 2020-08-22 PROCEDURE — 81241 F5 GENE: CPT

## 2020-08-22 PROCEDURE — 85027 COMPLETE CBC AUTOMATED: CPT

## 2020-08-22 PROCEDURE — 85306 CLOT INHIBIT PROT S FREE: CPT

## 2020-08-22 PROCEDURE — 36415 COLL VENOUS BLD VENIPUNCTURE: CPT

## 2020-08-22 PROCEDURE — 65660000000 HC RM CCU STEPDOWN

## 2020-08-22 PROCEDURE — 80061 LIPID PANEL: CPT

## 2020-08-22 PROCEDURE — 74011250636 HC RX REV CODE- 250/636: Performed by: FAMILY MEDICINE

## 2020-08-22 PROCEDURE — 74011250637 HC RX REV CODE- 250/637: Performed by: PSYCHIATRY & NEUROLOGY

## 2020-08-22 RX ORDER — MAGNESIUM SULFATE 100 %
4 CRYSTALS MISCELLANEOUS AS NEEDED
Status: DISCONTINUED | OUTPATIENT
Start: 2020-08-22 | End: 2020-08-23 | Stop reason: HOSPADM

## 2020-08-22 RX ORDER — INSULIN LISPRO 100 [IU]/ML
INJECTION, SOLUTION INTRAVENOUS; SUBCUTANEOUS
Status: DISCONTINUED | OUTPATIENT
Start: 2020-08-22 | End: 2020-08-23 | Stop reason: HOSPADM

## 2020-08-22 RX ADMIN — SODIUM CHLORIDE 100 ML/HR: 900 INJECTION, SOLUTION INTRAVENOUS at 06:37

## 2020-08-22 RX ADMIN — FAMOTIDINE 20 MG: 20 TABLET, FILM COATED ORAL at 08:23

## 2020-08-22 RX ADMIN — IOPAMIDOL 100 ML: 755 INJECTION, SOLUTION INTRAVENOUS at 14:47

## 2020-08-22 RX ADMIN — FAMOTIDINE 20 MG: 20 TABLET, FILM COATED ORAL at 21:40

## 2020-08-22 RX ADMIN — ASPIRIN 81 MG 81 MG: 81 TABLET ORAL at 08:23

## 2020-08-22 RX ADMIN — ATORVASTATIN CALCIUM 80 MG: 20 TABLET, FILM COATED ORAL at 21:40

## 2020-08-22 NOTE — PROGRESS NOTES
NEUROLOGY PROGRESS NOTE        Patient: Felicia Syed        Sex: male          DOA: 8/21/2020  YOB: 1980      Age:  46 y.o.         LOS: 1 day     Identification:  [id number] male presents with transient right arm heaviness. SUBJECTIVE:   No acute event overnight. Patient denies family history of stroke at early age. Stroke Work-up:  Brain MRI: Result Date: 8/21/2020    IMPRESSION: Mildly motion degraded study. 1.  No acute infarct, hemorrhage, mass effect, or herniation. 2.  Noncontrast MR brain within normal limits. Ct Head Wo Cont    Result Date: 8/21/2020  EXAM: CT head INDICATION: Left arm heaviness, retro-orbital pressure. COMPARISON: None. TECHNIQUE: Axial CT imaging of the head was performed without intravenous contrast. Standard multiplanar coronal and sagittal reformatted images were obtained and are included in interpretation. One or more dose reduction techniques were used on this CT: automated exposure control, adjustment of the mAs and/or kVp according to patient size, and iterative reconstruction techniques. The specific techniques used on this CT exam have been documented in the patient's electronic medical record. Digital Imaging and Communications in Medicine (DICOM) format image data are available to nonaffiliated external healthcare facilities or entities on a secure, media free, reciprocally searchable basis with patient authorization for at least a 12-month period after this study. _______________ FINDINGS: BRAIN AND POSTERIOR FOSSA: The sulci, folia, ventricles and basal cisterns are within normal limits for the patient?s age. There is no intracranial hemorrhage, mass effect, or midline shift. There are no areas of abnormal parenchymal attenuation. EXTRA-AXIAL SPACES AND MENINGES: There are no abnormal extra-axial fluid collections. CALVARIUM: Intact. SINUSES: Clear. OTHER: Included portions of the orbits appear within normal limits.  _______________ IMPRESSION: 1. No acute intracranial abnormality demonstrated. CRITICAL RESULT:  CODE S stroke results called to Dr. Chapo Shabazz in the emergency room prior to dictation at 12:32 PM on 8/21/2020. CTA of head and neck: pending  Echocardiogram: pending  Lipid panel: No results found for: CHOL, CHOLPOCT, CHOLX, CHLST, CHOLV, 894454, HDL, HDLP, LDL, LDLC, DLDLP, 112044, VLDLC, VLDL, TGLX, TRIGL, TRIGP, TGLPOCT, CHHD, CHHDX  HbA1c:   No results found for: HBA1C, HGBE8, AOG0NUBF, JLX7QTTE    REVIEW OF SYSTEMS: Denies chest pain, abdominal pain, nausea or vomiting. No fever or chills. OBJECTIVE:      Visit Vitals  /74 (BP 1 Location: Left arm, BP Patient Position: At rest)   Pulse (!) 50   Temp 97.7 °F (36.5 °C)   Resp 15   Wt 102.1 kg (225 lb 1.6 oz)   SpO2 100%   BMI 39.87 kg/m²     Physical Exam:  GEN: Alert, NAD  EYES: conjunctiva normal, lids with out lesions  HEENT: MMM. HEART: RRR +S1 +S2  LUNGS: CTA B/L no rales or rhonchi. ABDOMEN: Soft, non-tender. EXTREMITIES: No edema cyanosis  SKIN: no rashes or skin breakdown, no nodules  NEURO: Alert, oriented x3. Speech is fluent. Cranials: Face is symmetric. Smiles symmetrically. EOMI, VFF. Tongue is midline. Motor: Full strength at all sites. No pronator drift. Sensory: Intact to crude touch on all four. Coordination: Intact with no dysmetria during FNF.      Current Facility-Administered Medications   Medication Dose Route Frequency    glucose chewable tablet 16 g  4 Tab Oral PRN    glucagon (GLUCAGEN) injection 1 mg  1 mg IntraMUSCular PRN    insulin lispro (HUMALOG) injection   SubCUTAneous AC&HS    aspirin chewable tablet 81 mg  81 mg Oral DAILY    0.9% sodium chloride infusion  100 mL/hr IntraVENous CONTINUOUS    ondansetron (ZOFRAN) injection 4 mg  4 mg IntraVENous Q6H PRN    atorvastatin (LIPITOR) tablet 80 mg  80 mg Oral QHS    acetaminophen (TYLENOL) tablet 650 mg  650 mg Oral Q4H PRN    labetaloL (NORMODYNE;TRANDATE) 20 mg/4 mL (5 mg/mL) injection 5 mg  5 mg IntraVENous Q10MIN PRN    bisacodyL (DULCOLAX) tablet 5 mg  5 mg Oral DAILY PRN    famotidine (PEPCID) tablet 20 mg  20 mg Oral Q12H    nicotine (NICODERM CQ) 21 mg/24 hr patch 1 Patch  1 Patch TransDERmal DAILY       Laboratory  Recent Results (from the past 24 hour(s))   GLUCOSE, POC    Collection Time: 08/21/20 12:05 PM   Result Value Ref Range    Glucose (POC) 104 70 - 110 mg/dL   CBC WITH AUTOMATED DIFF    Collection Time: 08/21/20 12:08 PM   Result Value Ref Range    WBC 8.8 4.6 - 13.2 K/uL    RBC 5.54 (H) 4.70 - 5.50 M/uL    HGB 17.1 (H) 13.0 - 16.0 g/dL    HCT 49.3 (H) 36.0 - 48.0 %    MCV 89.0 74.0 - 97.0 FL    MCH 30.9 24.0 - 34.0 PG    MCHC 34.7 31.0 - 37.0 g/dL    RDW 12.9 11.6 - 14.5 %    PLATELET 482 113 - 360 K/uL    MPV 10.1 9.2 - 11.8 FL    NEUTROPHILS 73 40 - 73 %    LYMPHOCYTES 20 (L) 21 - 52 %    MONOCYTES 5 3 - 10 %    EOSINOPHILS 2 0 - 5 %    BASOPHILS 0 0 - 2 %    ABS. NEUTROPHILS 6.3 1.8 - 8.0 K/UL    ABS. LYMPHOCYTES 1.8 0.9 - 3.6 K/UL    ABS. MONOCYTES 0.4 0.05 - 1.2 K/UL    ABS. EOSINOPHILS 0.2 0.0 - 0.4 K/UL    ABS. BASOPHILS 0.0 0.0 - 0.1 K/UL    DF AUTOMATED     CARDIAC PANEL,(CK, CKMB & TROPONIN)    Collection Time: 08/21/20 12:08 PM   Result Value Ref Range    CK - MB 2.7 <3.6 ng/ml    CK-MB Index 1.2 0.0 - 4.0 %     39 - 308 U/L    Troponin-I, QT <0.02 0.0 - 0.484 NG/ML   METABOLIC PANEL, COMPREHENSIVE    Collection Time: 08/21/20 12:08 PM   Result Value Ref Range    Sodium 140 136 - 145 mmol/L    Potassium 4.2 3.5 - 5.5 mmol/L    Chloride 106 100 - 111 mmol/L    CO2 28 21 - 32 mmol/L    Anion gap 6 3.0 - 18 mmol/L    Glucose 102 (H) 74 - 99 mg/dL    BUN 18 7.0 - 18 MG/DL    Creatinine 0.98 0.6 - 1.3 MG/DL    BUN/Creatinine ratio 18 12 - 20      GFR est AA >60 >60 ml/min/1.73m2    GFR est non-AA >60 >60 ml/min/1.73m2    Calcium 9.2 8.5 - 10.1 MG/DL    Bilirubin, total 0.6 0.2 - 1.0 MG/DL    ALT (SGPT) 39 16 - 61 U/L    AST (SGOT) 18 10 - 38 U/L    Alk. phosphatase 64 45 - 117 U/L    Protein, total 7.5 6.4 - 8.2 g/dL    Albumin 4.1 3.4 - 5.0 g/dL    Globulin 3.4 2.0 - 4.0 g/dL    A-G Ratio 1.2 0.8 - 1.7     EKG, 12 LEAD, INITIAL    Collection Time: 08/21/20  1:25 PM   Result Value Ref Range    Ventricular Rate 59 BPM    Atrial Rate 59 BPM    P-R Interval 152 ms    QRS Duration 86 ms    Q-T Interval 400 ms    QTC Calculation (Bezet) 396 ms    Calculated P Axis 48 degrees    Calculated R Axis 65 degrees    Calculated T Axis 47 degrees    Diagnosis       Sinus bradycardia  Otherwise normal ECG  When compared with ECG of 13-MAR-2020 01:59,  Criteria for Septal infarct are no longer present  Nonspecific T wave abnormality no longer evident in Lateral leads     DRUG SCREEN, URINE    Collection Time: 08/21/20  3:25 PM   Result Value Ref Range    BENZODIAZEPINES Negative NEG      BARBITURATES Negative NEG      THC (TH-CANNABINOL) Positive (A) NEG      OPIATES Negative NEG      PCP(PHENCYCLIDINE) Negative NEG      COCAINE Positive (A) NEG      AMPHETAMINES Negative NEG      METHADONE Negative NEG      HDSCOM (NOTE)    GLUCOSE, POC    Collection Time: 08/21/20  9:20 PM   Result Value Ref Range    Glucose (POC) 243 (H) 70 - 110 mg/dL   CBC W/O DIFF    Collection Time: 08/22/20  5:00 AM   Result Value Ref Range    WBC 8.0 4.6 - 13.2 K/uL    RBC 5.25 4.70 - 5.50 M/uL    HGB 16.2 (H) 13.0 - 16.0 g/dL    HCT 48.4 (H) 36.0 - 48.0 %    MCV 92.2 74.0 - 97.0 FL    MCH 30.9 24.0 - 34.0 PG    MCHC 33.5 31.0 - 37.0 g/dL    RDW 12.7 11.6 - 14.5 %    PLATELET 910 823 - 957 K/uL    MPV 10.3 9.2 - 11.8 FL   GLUCOSE, POC    Collection Time: 08/22/20  6:14 AM   Result Value Ref Range    Glucose (POC) 103 70 - 110 mg/dL       Radiology:  Mri Brain Wo Cont    Result Date: 8/21/2020  EXAM: MRI BRAIN W/O CONTRAST INDICATION: Right arm numbness and heaviness, bilateral blurred vision COMPARISON: No prior study TECHNIQUE: Multiplanar multi sequence MR imaging of the brain was performed utilizing axial T2, FLAIR, diffusion, T2 gradient echo, and multiplanar T1 pre contrast imaging. No gadolinium was administered due to specific clinician request. _______________________ FINDINGS: Motion artifact is present which limits evaluation. VENTRICLES/EXTRA-AXIAL SPACES: The ventricles and sulci are normal in their size and configuration. BRAIN PARENCHYMA: No evidence of intracranial mass effect, midline shift, or herniation. No abnormal restricted diffusion to suggest acute infarct. No susceptibility artifact to suggest intraparenchymal hemorrhage. VASCULATURE:  The major intracranial vascular flow voids are grossly normal. ORBITS: The visualized orbits are unremarkable. PARANASAL SINUSES: Visualized paranasal sinuses are clear. MASTOID AIR CELLS: Visualized mastoid air cells are clear. OSSEOUS STRUCTURES: Unremarkable OTHER: None.  ________________________     IMPRESSION: Mildly motion degraded study. 1.  No acute infarct, hemorrhage, mass effect, or herniation. 2.  Noncontrast MR brain within normal limits. Ct Head Wo Cont    Result Date: 8/21/2020  EXAM: CT head INDICATION: Left arm heaviness, retro-orbital pressure. COMPARISON: None. TECHNIQUE: Axial CT imaging of the head was performed without intravenous contrast. Standard multiplanar coronal and sagittal reformatted images were obtained and are included in interpretation. One or more dose reduction techniques were used on this CT: automated exposure control, adjustment of the mAs and/or kVp according to patient size, and iterative reconstruction techniques. The specific techniques used on this CT exam have been documented in the patient's electronic medical record. Digital Imaging and Communications in Medicine (DICOM) format image data are available to nonaffiliated external healthcare facilities or entities on a secure, media free, reciprocally searchable basis with patient authorization for at least a 12-month period after this study. _______________ FINDINGS: BRAIN AND POSTERIOR FOSSA: The sulci, folia, ventricles and basal cisterns are within normal limits for the patient?s age. There is no intracranial hemorrhage, mass effect, or midline shift. There are no areas of abnormal parenchymal attenuation. EXTRA-AXIAL SPACES AND MENINGES: There are no abnormal extra-axial fluid collections. CALVARIUM: Intact. SINUSES: Clear. OTHER: Included portions of the orbits appear within normal limits. _______________     IMPRESSION: 1. No acute intracranial abnormality demonstrated. CRITICAL RESULT:  CODE S stroke results called to Dr. Gypsy Azevedo in the emergency room prior to dictation at 12:32 PM on 8/21/2020. ASSESSMENT/IMPRESSION:   30-year-old male presents to clinic today for sudden onset of blurry vision and right arm numbness, which resolved after an hour. He is admitted for stroke work-up. MRI was negative.     RECOMMENDATIONS:  1. Continue aspirin and Lipitor. 2. Pending CTA head and neck, TTE, fasting lipid panel, HbA1c.  3. Pending hypercoagulation panel  4. PT/OT/ST. 5. Normotensive. Signed:   Andrew Calvo MD  8/22/2020  10:42 AM

## 2020-08-22 NOTE — PROGRESS NOTES
1950 Assumed care from [place] [id number] [facility]. Stroke Education provided to patient and the following topics were discussed    1. Patients personal risk factors for stroke are smoking and obesity    2. Warning signs of Stroke:        * Sudden numbness or weakness of the face, arm or leg, especially on one side of          The body            * Sudden confusion, trouble speaking or understanding        * Sudden trouble seeing in one or both eyes        * Sudden trouble walking, dizziness, loss of balance or coordination        * Sudden severe headache with no known cause      3. Importance of activation Emergency Medical Services ( 9-1-1 ) immediately if experience any warning signs of stroke. 4. Be sure and schedule a follow-up appointment with your primary care doctor or any specialists as instructed. 5. You must take medicine every day to treat your risk factors for stroke. Be sure to take your medicines exactly as your doctor tells you: no more, no less. Know what your medicines are for , what they do. Anti-thrombotics /anticoagulants can help prevent strokes. You are taking the following medicine(s)  Lipitor and aspirin. 6.  Smoking and second-hand smoke greatly increase your risk of stroke, cardiovascular disease and death. Smoking history cigarettes, 1 per day    7. Information provided was [facility] Stroke Education Binder    8. Documentation of teaching completed in Patient Education Activity and on Care Plan with teaching response noted? yes    Shift uneventful. [address] [place] [address] Cardiac/Medical Night Shift Chart Audit    Chart Audit completed? YES    0700 Bedside and Verbal shift change report given to Zack Mix (oncoming nurse) by Subhash Bai RN   (offgoing nurse). Report included the following information SBAR, Kardex, ED Summary, Procedure Summary, Intake/Output, MAR, Recent Results and Med Rec Status.

## 2020-08-22 NOTE — PROGRESS NOTES
Physical Therapy Screening:  Services are not indicated at this time. Orders received. Chart reviewed. PT assessment not performed at this time as pt demonstrates I w/ mobility. Pt demonstrates strength and ROM WNLs. Pt denies any sensation deficits at this time. Pt has been up ambulating I w/ any LOB in the room. Will d/c orders at this time. Please reorder PT if pt's mobility status changes.        Laura Campbell, PT

## 2020-08-22 NOTE — PROGRESS NOTES
Speech Therapy Note:        ST orders received; however, upon completion of chart review and discussion with RN, Jadyn Astorga patient not appropriate for ST evaluation at this time. Currently, patient is:    Piyush ] Tolerating current po diet (per RN report)  [ ] Tolerating current po diet; however, poor po intake (per RN report)  [ ] Receiving nutrition via tube feeding   [ ] Lethargic, somnolent or difficult to arouse for safe po trials  [ ] Unable to manage secretions  [ ] Receiving intervention for respiratory distress (excluding O2 via nasal cannula)  [ ] <6 hours status post extubation   Piyush ] Other: 1st attempt Pt is currently NPO for procedure (12:45 PM)  2nd attempt: Pt is heading off the floor for procedure now. Pt is last case of day. (13:54 PM)    RN reports no concerns for aspiration. Pt tolerating regular diet and meds well. SLP will follow up later this date or on next business day.       Thank you for this referral    Tuan Suarez, [id number] [facility]  Speech Language Pathologist

## 2020-08-22 NOTE — PROGRESS NOTES
CM on call, chart reviewed. Spoke with pt via phone. Limited english. Pt lives with wife, IADLs, no DME use. States arm is better today. CM will continue to follow PT/OT recommendations for d/c needs. Care Management Interventions  Current Support Network: Lives with Spouse  Confirm Follow Up Transport: Family    Reason for Admission:   TIA                   RUR Score:     8                Plan for utilizing home health:     TBD     PCP: First and Last name:     Name of Practice:    Are you a current patient: Yes/No:    Approximate date of last visit:    Can you participate in a virtual visit with your PCP:                     Current Advanced Directive/Advance Care Plan:                          Transition of Care Plan:      Home with family assist.  ? [facility]

## 2020-08-22 NOTE — PROGRESS NOTES
Problem: Falls - Risk of  Goal: *Absence of Falls  Description: Document Kamla Combs Fall Risk and appropriate interventions in the flowsheet.   Outcome: Progressing Towards Goal  Note: Fall Risk Interventions:            Medication Interventions: Evaluate medications/consider consulting pharmacy

## 2020-08-22 NOTE — PROGRESS NOTES
3114 Coty Estes care of the patient from David Smith RN (offgoing Nurse). Patient is alert and oriented. Pt denies any pain or discomfort at this moment. bed in low position, call bell within reach. 1900 Patient had an uneventful shift and remained stable. Purposeful hourly rounding completed throughout the shift, NAD noted at this time, and patient is resting quietly in bed. No concerns or requests voiced    1924 Bedside and Verbal shift change report given to Adam Boswell RN(oncoming nurse) by Suzette Lala RN (offgoing nurse). Report included the following information SBAR, Kardex, Intake/Output, MAR and Cardiac Rhythm .

## 2020-08-22 NOTE — PROGRESS NOTES
OCCUPATIONAL THERAPY EVALUATION/DISCHARGE    Patient: Debra Rose (43 y.o. male)  Date: 8/22/2020  Primary Diagnosis: TIA (transient ischemic attack) [G45.9]        Precautions:   Fall  PLOF: independent with ADLs and transfers     ASSESSMENT AND RECOMMENDATIONS:  Based on the objective data described below, the patient presents supine in bed speakin with family on phone. Pt independent with ADLs and transfers during this session. Reports no more blurred vision and no weakness or impaired tone noticed during this session. Pt participate with bed mobility, LB dressing and toilet transfers independently. Pt was left seated at EOB at the end of session in NAD, pain 0/10. .    Skilled occupational therapy is not indicated at this time. Discharge Recommendations: None  Further Equipment Recommendations for Discharge: N/A      SUBJECTIVE:   Patient stated  I am ok.     OBJECTIVE DATA SUMMARY:   History reviewed. No pertinent past medical history. History reviewed. No pertinent surgical history. Barriers to Learning/Limitations: None  Compensate with: visual, verbal, tactile, kinesthetic cues/model    Home Situation:   Home Situation  Home Environment: Apartment  # Steps to Enter: 0  One/Two Story Residence: One story  Living Alone: No  Support Systems: Spouse/Significant Other/Partner  Patient Expects to be Discharged to[de-identified] Apartment  Current DME Used/Available at Home: None  Tub or Shower Type: Tub/Shower combination  [x]     Right hand dominant   []     Left hand dominant    Cognitive/Behavioral Status:  Neurologic State: Alert  Orientation Level: Oriented X4  Cognition: Appropriate for age attention/concentration; Follows commands  Safety/Judgement: Fall prevention    Skin: intact  Edema: none    Vision/Perceptual:    Tracking: Able to track stimulus in all quadrants w/o difficulty(reports no more blurred vision)    Coordination: BUE  Coordination: Within functional limits  Fine Motor Skills-Upper: Left Intact; Right Intact    Gross Motor Skills-Upper: Left Intact; Right Intact  Balance:  Sitting: Intact  Standing: Intact  Strength: BUE  Strength: Generally decreased, functional  Tone & Sensation: BUE  Sensation: Intact  Range of Motion: BUE  AROM: Generally decreased, functional  Functional Mobility and Transfers for ADLs:  Bed Mobility:  Rolling: Independent  Supine to Sit: Independent  Sit to Supine: Independent  Scooting: Independent  Transfers:  Sit to Stand: Independent  Stand to Sit: Independent   Toilet Transfer : Independent  ADL Assessment:  Feeding: Independent    Oral Facial Hygiene/Grooming: Independent    Upper Body Dressing: Independent    Lower Body Dressing: Independent    Toileting: Independent  ADL Intervention:  Lower Body Dressing Assistance  Dressing Assistance: Independent  Socks: Independent  Shoes with Cloth Laces: Independent  Leg Crossed Method Used: No  Position Performed: Bending forward method;Seated edge of bed  Cues: Don    Cognitive Retraining  Safety/Judgement: Fall prevention    Therapeutic Exercise:    Pain:  Pain level pre-treatment: 0/10   Pain level post-treatment: 0/10   Pain Intervention(s): Medication (see MAR); Rest, Ice, Repositioning   Response to intervention: Nurse notified, See doc flow    Activity Tolerance:   Good   Please refer to the flowsheet for vital signs taken during this treatment. After treatment:   []  Patient left in no apparent distress sitting up in chair  [x]  Patient left in no apparent distress in bed  [x]  Call bell left within reach  []  Nursing notified  []  Caregiver present  []  Bed alarm activated    COMMUNICATION/EDUCATION:   [x]      Role of Occupational Therapy in the acute care setting  [x]      Home safety education was provided and the patient/caregiver indicated understanding. [x]      Patient/family have participated as able and agree with findings and recommendations. []      Patient is unable to participate in plan of care at this time.     Thank you for this referral.  Terence Crespo OTR/L  Time Calculation: 10 mins      Eval Complexity: History: LOW Complexity : Brief history review ; Examination: LOW Complexity : 1-3 performance deficits relating to physical, cognitive , or psychosocial skils that result in activity limitations and / or participation restrictions ;    Decision Making:LOW Complexity : No comorbidities that affect functional and no verbal or physical assistance needed to complete eval tasks

## 2020-08-22 NOTE — PROGRESS NOTES
Problem: Falls - Risk of  Goal: *Absence of Falls  Description: Document Alix May Fall Risk and appropriate interventions in the flowsheet.   8/22/2020 0815 by Yohana Chamorro RN  Outcome: Progressing Towards Goal  Note: Fall Risk Interventions:            Medication Interventions: Evaluate medications/consider consulting pharmacy, Patient to call before getting OOB, Teach patient to arise slowly                8/22/2020 0814 by Yohana Chamorro RN  Outcome: Progressing Towards Goal  Note: Fall Risk Interventions:            Medication Interventions: Evaluate medications/consider consulting pharmacy, Patient to call before getting OOB, Teach patient to arise slowly                   Problem: Patient Education: Go to Patient Education Activity  Goal: Patient/Family Education  8/22/2020 0815 by Yohana Chamorro RN  Outcome: Progressing Towards Goal  8/22/2020 0814 by Yohana Chamorro RN  Outcome: Progressing Towards Goal     Problem: Patient Education: Go to Patient Education Activity  Goal: Patient/Family Education  Outcome: Progressing Towards Goal     Problem: TIA/CVA Stroke: 0-24 hours  Goal: Off Pathway (Use only if patient is Off Pathway)  Outcome: Progressing Towards Goal  Goal: Activity/Safety  Outcome: Progressing Towards Goal  Goal: Consults, if ordered  Outcome: Progressing Towards Goal  Goal: Diagnostic Test/Procedures  Outcome: Progressing Towards Goal  Goal: Nutrition/Diet  Outcome: Progressing Towards Goal  Goal: Discharge Planning  Outcome: Progressing Towards Goal  Goal: Medications  Outcome: Progressing Towards Goal  Goal: Respiratory  Outcome: Progressing Towards Goal  Goal: Treatments/Interventions/Procedures  Outcome: Progressing Towards Goal  Goal: Minimize risk of bleeding post-thrombolytic infusion  Outcome: Progressing Towards Goal  Goal: Monitor for complications post-thrombolytic infusion  Outcome: Progressing Towards Goal  Goal: Psychosocial  Outcome: Progressing Towards Goal  Goal: *Hemodynamically stable  Outcome: Progressing Towards Goal  Goal: *Neurologically stable  Description: Absence of additional neurological deficits    Outcome: Progressing Towards Goal  Goal: *Verbalizes anxiety and depression are reduced or absent  Outcome: Progressing Towards Goal  Goal: *Absence of Signs of Aspiration on Current Diet  Outcome: Progressing Towards Goal  Goal: *Absence of deep venous thrombosis signs and symptoms(Stroke Metric)  Outcome: Progressing Towards Goal  Goal: *Ability to perform ADLs and demonstrates progressive mobility and function  Outcome: Progressing Towards Goal  Goal: *Stroke education started(Stroke Metric)  Outcome: Progressing Towards Goal  Goal: *Dysphagia screen performed(Stroke Metric)  Outcome: Progressing Towards Goal  Goal: *Rehab consulted(Stroke Metric)  Outcome: Progressing Towards Goal     Problem: TIA/CVA Stroke: Day 2 Until Discharge  Goal: Off Pathway (Use only if patient is Off Pathway)  Outcome: Progressing Towards Goal  Goal: Activity/Safety  Outcome: Progressing Towards Goal  Goal: Diagnostic Test/Procedures  Outcome: Progressing Towards Goal  Goal: Nutrition/Diet  Outcome: Progressing Towards Goal  Goal: Discharge Planning  Outcome: Progressing Towards Goal  Goal: Medications  Outcome: Progressing Towards Goal  Goal: Respiratory  Outcome: Progressing Towards Goal  Goal: Treatments/Interventions/Procedures  Outcome: Progressing Towards Goal  Goal: Psychosocial  Outcome: Progressing Towards Goal  Goal: *Verbalizes anxiety and depression are reduced or absent  Outcome: Progressing Towards Goal  Goal: *Absence of aspiration  Outcome: Progressing Towards Goal  Goal: *Absence of deep venous thrombosis signs and symptoms(Stroke Metric)  Outcome: Progressing Towards Goal  Goal: *Optimal pain control at patient's stated goal  Outcome: Progressing Towards Goal  Goal: *Tolerating diet  Outcome: Progressing Towards Goal  Goal: *Ability to perform ADLs and demonstrates progressive mobility and function  Outcome: Progressing Towards Goal  Goal: *Stroke education continued(Stroke Metric)  Outcome: Progressing Towards Goal     Problem: Ischemic Stroke: Discharge Outcomes  Goal: *Verbalizes anxiety and depression are reduced or absent  Outcome: Progressing Towards Goal  Goal: *Verbalize understanding of risk factor modification(Stroke Metric)  Outcome: Progressing Towards Goal  Goal: *Hemodynamically stable  Outcome: Progressing Towards Goal  Goal: *Absence of aspiration pneumonia  Outcome: Progressing Towards Goal  Goal: *Aware of needed dietary changes  Outcome: Progressing Towards Goal  Goal: *Verbalize understanding of prescribed medications including anti-coagulants, anti-lipid, and/or anti-platelets(Stroke Metric)  Outcome: Progressing Towards Goal  Goal: *Tolerating diet  Outcome: Progressing Towards Goal  Goal: *Aware of follow-up diagnostics related to anticoagulants  Outcome: Progressing Towards Goal  Goal: *Ability to perform ADLs and demonstrates progressive mobility and function  Outcome: Progressing Towards Goal  Goal: *Absence of DVT(Stroke Metric)  Outcome: Progressing Towards Goal  Goal: *Absence of aspiration  Outcome: Progressing Towards Goal  Goal: *Optimal pain control at patient's stated goal  Outcome: Progressing Towards Goal  Goal: *Home safety concerns addressed  Outcome: Progressing Towards Goal  Goal: *Describes available resources and support systems  Outcome: Progressing Towards Goal  Goal: *Verbalizes understanding of activation of EMS(911) for stroke symptoms(Stroke Metric)  Outcome: Progressing Towards Goal  Goal: *Understands and describes signs and symptoms to report to providers(Stroke Metric)  Outcome: Progressing Towards Goal  Goal: *Neurolgocially stable (absence of additional neurological deficits)  Outcome: Progressing Towards Goal  Goal: *Verbalizes importance of follow-up with primary care physician(Stroke Metric)  Outcome: Progressing Towards Goal  Goal: *Smoking cessation discussed,if applicable(Stroke Metric)  Outcome: Progressing Towards Goal  Goal: *Depression screening completed(Stroke Metric)  Outcome: Progressing Towards Goal

## 2020-08-22 NOTE — PROGRESS NOTES
Hospitalist Progress Note    Patient: Konrad Boyer MRN: [id number]  CSN: [id number]    YOB: 1980  Age: 46 y.o. Sex: male    DOA: 8/21/2020 LOS:  LOS: 1 day          Chief Complaint:    Blurred vision and RUE numbness    Assessment/Plan     41-year-old male with a history of nicotine dependence with sudden onset of blurred vision and right arm numbness which resolved after an hour. Admitted for possible TIA and stroke evaluation      TIA -  Aspirin 81 mg p.o. daily  Echocardiogram with bubble study pending   CTA head and neck ordered and pending  Lipid panel: , HDL 35, LDL 80.6, Trigs 147  UDS positive for COCAINE and THC  Hemoglobin A1c pending  SCDs and DVT prophylaxis  PT/OT and SLP consults  Neurology following, Dr. Krzysztof Arevalo     Cocaine use -  Advised immediate cessation to decrease risk of stroke and MI      Nicotine dependence -  Advised immediate cessation     DVT prophylaxis/GI prophylaxis ordered    Disposition : Home   Patient Active Problem List   Diagnosis Code    TIA (transient ischemic attack) G45.9       Subjective:    Uneventful night. No complaints or issues.      Review of systems:    Constitutional: denies fevers, chills, myalgias  Respiratory: denies SOB, cough  Cardiovascular: denies chest pain, palpitations  Gastrointestinal: denies nausea, vomiting, diarrhea      Vital signs/Intake and Output:  Visit Vitals  /76 (BP 1 Location: Left arm, BP Patient Position: At rest;Sitting)   Pulse (!) 57   Temp 97.9 °F (36.6 °C)   Resp 16   Wt 102.1 kg (225 lb 1.6 oz)   SpO2 100%   BMI 39.87 kg/m²     Current Shift:  08/22 0701 - 08/22 1900  In: 240 [P.O.:240]  Out: -   Last three shifts:  08/20 1901 - 08/22 0700  In: 2043.3 [P.O.:960; I.V.:1083.3]  Out: 600 [Urine:600]    Exam:    General: Well developed, alert, NAD, OX3  Head/Neck: NCAT, supple, No masses, No lymphadenopathy  CVS:Regular rate and rhythm, no M/R/G, S1/S2 heard, no thrill  Lungs:Clear to auscultation bilaterally, no wheezes, rhonchi, or rales  Abdomen: Soft, Nontender, No distention, Normal Bowel sounds, No hepatomegaly  Extremities: No C/C/E, pulses palpable 2+  Skin:normal texture and turgor, no rashes, no lesions  Neuro:grossly normal , follows commands  Psych:appropriate                Labs: Results:       Chemistry Recent Labs     08/21/20  1208   *      K 4.2      CO2 28   BUN 18   CREA 0.98   CA 9.2   AGAP 6   BUCR 18   AP 64   TP 7.5   ALB 4.1   GLOB 3.4   AGRAT 1.2      CBC w/Diff Recent Labs     08/22/20  0500 08/21/20  1208   WBC 8.0 8.8   RBC 5.25 5.54*   HGB 16.2* 17.1*   HCT 48.4* 49.3*    177   GRANS  --  73   LYMPH  --  20*   EOS  --  2      Cardiac Enzymes Recent Labs     08/21/20  1208      CKND1 1.2      Coagulation No results for input(s): PTP, INR, APTT, INREXT in the last 72 hours. Lipid Panel Lab Results   Component Value Date/Time    Cholesterol, total 145 08/22/2020 05:00 AM    HDL Cholesterol 35 (L) 08/22/2020 05:00 AM    LDL, calculated 80.6 08/22/2020 05:00 AM    VLDL, calculated 29.4 08/22/2020 05:00 AM    Triglyceride 147 08/22/2020 05:00 AM    CHOL/HDL Ratio 4.1 08/22/2020 05:00 AM      BNP No results for input(s): BNPP in the last 72 hours.    Liver Enzymes Recent Labs     08/21/20  1208   TP 7.5   ALB 4.1   AP 64      Thyroid Studies No results found for: T4, T3U, TSH, TSHEXT     Procedures/imaging: see electronic medical records for all procedures/Xrays and details which were not copied into this note but were reviewed prior to creation of Maksim Kiran MD

## 2020-08-23 VITALS
BODY MASS INDEX: 39.87 KG/M2 | OXYGEN SATURATION: 98 % | TEMPERATURE: 97.5 F | WEIGHT: 225.1 LBS | DIASTOLIC BLOOD PRESSURE: 66 MMHG | SYSTOLIC BLOOD PRESSURE: 121 MMHG | HEART RATE: 73 BPM | RESPIRATION RATE: 17 BRPM

## 2020-08-23 PROBLEM — F14.90 COCAINE USE: Status: ACTIVE | Noted: 2020-08-23

## 2020-08-23 PROBLEM — G45.9 TIA (TRANSIENT ISCHEMIC ATTACK): Status: RESOLVED | Noted: 2020-08-21 | Resolved: 2020-08-23

## 2020-08-23 LAB
ATRIAL RATE: 59 BPM
CALCULATED P AXIS, ECG09: 48 DEGREES
CALCULATED R AXIS, ECG10: 65 DEGREES
CALCULATED T AXIS, ECG11: 47 DEGREES
CHOLEST SERPL-MCNC: 126 MG/DL
DIAGNOSIS, 93000: NORMAL
ERYTHROCYTE [DISTWIDTH] IN BLOOD BY AUTOMATED COUNT: 12.6 % (ref 11.6–14.5)
GLUCOSE BLD STRIP.AUTO-MCNC: 104 MG/DL (ref 70–110)
GLUCOSE BLD STRIP.AUTO-MCNC: 116 MG/DL (ref 70–110)
GLUCOSE BLD STRIP.AUTO-MCNC: 117 MG/DL (ref 70–110)
HCT VFR BLD AUTO: 50 % (ref 36–48)
HDLC SERPL-MCNC: 34 MG/DL (ref 40–60)
HDLC SERPL: 3.7 {RATIO} (ref 0–5)
HGB BLD-MCNC: 16.7 G/DL (ref 13–16)
LDLC SERPL CALC-MCNC: 62.8 MG/DL (ref 0–100)
LIPID PROFILE,FLP: ABNORMAL
MCH RBC QN AUTO: 30.8 PG (ref 24–34)
MCHC RBC AUTO-ENTMCNC: 33.4 G/DL (ref 31–37)
MCV RBC AUTO: 92.1 FL (ref 74–97)
P-R INTERVAL, ECG05: 152 MS
PLATELET # BLD AUTO: 159 K/UL (ref 135–420)
PMV BLD AUTO: 10.1 FL (ref 9.2–11.8)
Q-T INTERVAL, ECG07: 400 MS
QRS DURATION, ECG06: 86 MS
QTC CALCULATION (BEZET), ECG08: 396 MS
RBC # BLD AUTO: 5.43 M/UL (ref 4.7–5.5)
TRIGL SERPL-MCNC: 146 MG/DL (ref ?–150)
VENTRICULAR RATE, ECG03: 59 BPM
VLDLC SERPL CALC-MCNC: 29.2 MG/DL
WBC # BLD AUTO: 8.2 K/UL (ref 4.6–13.2)

## 2020-08-23 PROCEDURE — 82962 GLUCOSE BLOOD TEST: CPT

## 2020-08-23 PROCEDURE — 85027 COMPLETE CBC AUTOMATED: CPT

## 2020-08-23 PROCEDURE — 80061 LIPID PANEL: CPT

## 2020-08-23 PROCEDURE — 74011250637 HC RX REV CODE- 250/637: Performed by: PSYCHIATRY & NEUROLOGY

## 2020-08-23 PROCEDURE — 74011250637 HC RX REV CODE- 250/637: Performed by: FAMILY MEDICINE

## 2020-08-23 PROCEDURE — 36415 COLL VENOUS BLD VENIPUNCTURE: CPT

## 2020-08-23 RX ORDER — GUAIFENESIN 100 MG/5ML
81 LIQUID (ML) ORAL DAILY
Qty: 30 TAB | Refills: 0 | Status: SHIPPED | OUTPATIENT
Start: 2020-08-24

## 2020-08-23 RX ADMIN — FAMOTIDINE 20 MG: 20 TABLET, FILM COATED ORAL at 08:37

## 2020-08-23 RX ADMIN — ASPIRIN 81 MG 81 MG: 81 TABLET ORAL at 08:37

## 2020-08-23 NOTE — DISCHARGE SUMMARY
Discharge Summary    Patient: Dbera Rose MRN: [id number]  CSN: [id number]    YOB: 1980  Age: 46 y.o. Sex: male    DOA: 8/21/2020 LOS:  LOS: 2 days   Discharge Date:      Primary Care Provider:  None    Admission Diagnoses: TIA (transient ischemic attack) [G45.9]    Discharge Diagnoses:    .prob  Problem List as of 8/23/2020 Never Reviewed          Codes Class Noted - Resolved    TIA (transient ischemic attack) ICD-10-CM: G45.9  ICD-9-CM: 435.9  8/21/2020 - Present              Discharge Medications:     Current Discharge Medication List      START taking these medications    Details   aspirin 81 mg chewable tablet Take 1 Tab by mouth daily. Can get over the counter. Qty: 30 Tab, Refills: 0             Discharge Condition: improved    Procedures : None     Consults: Neurology, Dr. Hunter Bray  Neurology in the emergency department      PHYSICAL EXAM   Visit Vitals  /66 (BP 1 Location: Left arm, BP Patient Position: Sitting)   Pulse 73   Temp 97.5 °F (36.4 °C)   Resp 17   Wt 102.1 kg (225 lb 1.6 oz)   SpO2 98%   BMI 39.87 kg/m²     General: Awake, cooperative, no acute distress    HEENT: NC, Atraumatic. PERRLA, EOMI. Anicteric sclerae. Lungs:  CTA Bilaterally. No Wheezing/Rhonchi/Rales. Heart:  Regular  rhythm,  No murmur, No Rubs, No Gallops  Abdomen: Soft, Non distended, Non tender. +Bowel sounds,   Extremities: No c/c/e  Psych:   Not anxious or agitated. Neurologic:  No acute neurological deficits. Admission HPI :   Debra Rose is a 46 y.o. 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 He also reported that he had blurred vision as well.   When he presented to the emergency room with those symptoms a code stroke was called in the emergency department. The tele-neurologist evaluated patient and felt that since all of his symptoms have resolved by the time that he that he was evaluated that his likely diagnosis was TIA. Recommend that MRI of the brain be ordered versus a CT angiogram of brain and neck.      Hospital Course :     67-year-old male with a history of nicotine dependence with sudden onset of blurred vision and right arm numbness which resolved after an hour. Admitted for possible TIA and stroke evaluation      Visual disturbance associated with right arm numbness -resolved  Drug evaluation unremarkable  CT head and neck negative  Brain MRI negative  TTE can be obtained outpatient  Neurology following, Dr. Reno hamm DC today   Continue daily aspirin use 81 mg p.o. daily  Lipitor can be discontinued  Follow-up with Dr. Maggie Chase in 1 to 2 months     Cocaine use -  Strongly advised patient to never use cocaine again     Nicotine dependence -  Advised immediate cessation    Activity: As tolerated    Diet: Heart healthy/cardiac diet, diet should not include cocaine    Follow-up:  We will need to get transthoracic echocardiogram as outpatient, will need to follow-up with Dr. Maggie Chase, neurology, in 1 to 2 months  Patient needs to follow up outpatient after 1-2 months for  hypercoagulability panel.      Disposition: Home    Minutes spent on discharge: 20 minutes      Labs: Results:       Chemistry Recent Labs     08/22/20  1225 08/21/20  1208   * 102*    140   K 4.1 4.2    106   CO2 28 28   BUN 12 18   CREA 0.80 0.98   CA 8.9 9.2   AGAP 5 6   BUCR 15 18   AP 63 64   TP 7.1 7.5   ALB 3.8 4.1   GLOB 3.3 3.4   AGRAT 1.2 1.2      CBC w/Diff Recent Labs     08/23/20  0558 08/22/20  0500 08/21/20  1208   WBC 8.2 8.0 8.8   RBC 5.43 5.25 5.54*   HGB 16.7* 16.2* 17.1*   HCT 50.0* 48.4* 49.3*    174 177   GRANS  --   --  73   LYMPH  --   --  20*   EOS  --   --  2      Cardiac Enzymes Recent Labs     08/21/20  1208      CKND1 1. 2      Coagulation No results for input(s): PTP, INR, APTT, INREXT in the last 72 hours. Lipid Panel Lab Results   Component Value Date/Time    Cholesterol, total 126 08/23/2020 05:58 AM    HDL Cholesterol 34 (L) 08/23/2020 05:58 AM    LDL, calculated 62.8 08/23/2020 05:58 AM    VLDL, calculated 29.2 08/23/2020 05:58 AM    Triglyceride 146 08/23/2020 05:58 AM    CHOL/HDL Ratio 3.7 08/23/2020 05:58 AM      BNP No results for input(s): BNPP in the last 72 hours. Liver Enzymes Recent Labs     08/22/20  1225   TP 7.1   ALB 3.8   AP 63      Thyroid Studies No results found for: T4, T3U, TSH, TSHEXT         Significant Diagnostic Studies: Mri Brain Wo Cont    Result Date: 8/21/2020  EXAM: MRI BRAIN W/O CONTRAST INDICATION: Right arm numbness and heaviness, bilateral blurred vision COMPARISON: No prior study TECHNIQUE: Multiplanar multi sequence MR imaging of the brain was performed utilizing axial T2, FLAIR, diffusion, T2 gradient echo, and multiplanar T1 pre contrast imaging. No gadolinium was administered due to specific clinician request. _______________________ FINDINGS: Motion artifact is present which limits evaluation. VENTRICLES/EXTRA-AXIAL SPACES: The ventricles and sulci are normal in their size and configuration. BRAIN PARENCHYMA: No evidence of intracranial mass effect, midline shift, or herniation. No abnormal restricted diffusion to suggest acute infarct. No susceptibility artifact to suggest intraparenchymal hemorrhage. VASCULATURE:  The major intracranial vascular flow voids are grossly normal. ORBITS: The visualized orbits are unremarkable. PARANASAL SINUSES: Visualized paranasal sinuses are clear. MASTOID AIR CELLS: Visualized mastoid air cells are clear. OSSEOUS STRUCTURES: Unremarkable OTHER: None.  ________________________     IMPRESSION: Mildly motion degraded study. 1.  No acute infarct, hemorrhage, mass effect, or herniation. 2.  Noncontrast MR brain within normal limits.       Ct Head Wo Cont    Result Date: 8/21/2020  EXAM: CT head INDICATION: Left arm heaviness, retro-orbital pressure. COMPARISON: None. TECHNIQUE: Axial CT imaging of the head was performed without intravenous contrast. Standard multiplanar coronal and sagittal reformatted images were obtained and are included in interpretation. One or more dose reduction techniques were used on this CT: automated exposure control, adjustment of the mAs and/or kVp according to patient size, and iterative reconstruction techniques. The specific techniques used on this CT exam have been documented in the patient's electronic medical record. Digital Imaging and Communications in Medicine (DICOM) format image data are available to nonaffiliated external healthcare facilities or entities on a secure, media free, reciprocally searchable basis with patient authorization for at least a 12-month period after this study. _______________ FINDINGS: BRAIN AND POSTERIOR FOSSA: The sulci, folia, ventricles and basal cisterns are within normal limits for the patient?s age. There is no intracranial hemorrhage, mass effect, or midline shift. There are no areas of abnormal parenchymal attenuation. EXTRA-AXIAL SPACES AND MENINGES: There are no abnormal extra-axial fluid collections. CALVARIUM: Intact. SINUSES: Clear. OTHER: Included portions of the orbits appear within normal limits. _______________     IMPRESSION: 1. No acute intracranial abnormality demonstrated. CRITICAL RESULT:  CODE S stroke results called to Dr. Tahir Melvin in the emergency room prior to dictation at 12:32 PM on 8/21/2020. Cta Head Neck W Cont    Result Date: 8/22/2020  CTA head and CTA neck with contrast 8/22/2020. CLINICAL INDICATION/HISTORY:   Acute onset of blurred vision and right upper extremity numbness which resolved after one hour. COMPARISON:   CT scan of the head and MRI of the brain from 8/21/2020.  TECHNIQUE: Multiple axial CT images of the neck were obtained extending from the level of the aortic arch to the skull base after administration IV contrast utilizing a CTA protocol. Multiple axial CT images of the head were obtained extending from below the level of the skull base to the vertex after the administration of the IV contrast utilizing a CTA protocol. Multiplanar reconstructions were obtained, including MIP reconstructions as well as curved planar reformats. Multiple additional 3-D surface rendering reformations were performed at a separate workstation. Dose reduction techniques:  Automated exposure control, mAs and/or kVp reductions based on patient size, and iterative reconstruction. The specific techniques utilized on this CT exam have been documented in the patient's electronic medical record. Digital imaging and communications and medicine (DICOM) format image data are available to nonaffiliated external healthcare facilities or entities on a secure, media free, reciprocally searchable basis with patient authorization for at least a 12 month period after this study. FINDINGS: GREAT VESSEL ORIGINS:   Patent. CERVICAL VASCULATURE:   The common carotid arteries are patent throughout the thoracic and cervical segments. The carotid bifurcations are clear with 0% stenosis by NASCET criteria. The cervical portions of both internal carotid arteries are patent. Vertebral artery codominant cyst noted. The vertebral artery origins are patent. Both vertebral arteries are also patent throughout their cervical segments. INTRACRANIAL VASCULATURE:   There is no evidence of hemodynamically significant stenosis or occlusion involving the main intracranial branches. There is no evidence of aneurysm formation or underlying vascular malformation. The visualized portions of the dural venous sinuses are patent. OTHER:   NONE. IMPRESSION: 1. Unremarkable CTA of the head and neck. No results found for this or any previous visit.         CC: None

## 2020-08-23 NOTE — PROGRESS NOTES
Problem: Falls - Risk of  Goal: *Absence of Falls  Description: Document Hayder Aldrich Fall Risk and appropriate interventions in the flowsheet.   Outcome: Progressing Towards Goal  Note: Fall Risk Interventions:            Medication Interventions: Teach patient to arise slowly, Patient to call before getting OOB, Evaluate medications/consider consulting pharmacy                   Problem: Patient Education: Go to Patient Education Activity  Goal: Patient/Family Education  Outcome: Progressing Towards Goal     Problem: Patient Education: Go to Patient Education Activity  Goal: Patient/Family Education  Outcome: Progressing Towards Goal     Problem: TIA/CVA Stroke: 0-24 hours  Goal: Off Pathway (Use only if patient is Off Pathway)  Outcome: Progressing Towards Goal  Goal: Activity/Safety  Outcome: Progressing Towards Goal  Goal: Consults, if ordered  Outcome: Progressing Towards Goal  Goal: Diagnostic Test/Procedures  Outcome: Progressing Towards Goal  Goal: Nutrition/Diet  Outcome: Progressing Towards Goal  Goal: Discharge Planning  Outcome: Progressing Towards Goal  Goal: Medications  Outcome: Progressing Towards Goal  Goal: Respiratory  Outcome: Progressing Towards Goal  Goal: Treatments/Interventions/Procedures  Outcome: Progressing Towards Goal  Goal: Minimize risk of bleeding post-thrombolytic infusion  Outcome: Progressing Towards Goal  Goal: Monitor for complications post-thrombolytic infusion  Outcome: Progressing Towards Goal  Goal: Psychosocial  Outcome: Progressing Towards Goal  Goal: *Hemodynamically stable  Outcome: Progressing Towards Goal  Goal: *Neurologically stable  Description: Absence of additional neurological deficits    Outcome: Progressing Towards Goal  Goal: *Verbalizes anxiety and depression are reduced or absent  Outcome: Progressing Towards Goal  Goal: *Absence of Signs of Aspiration on Current Diet  Outcome: Progressing Towards Goal  Goal: *Absence of deep venous thrombosis signs and symptoms(Stroke Metric)  Outcome: Progressing Towards Goal  Goal: *Ability to perform ADLs and demonstrates progressive mobility and function  Outcome: Progressing Towards Goal  Goal: *Stroke education started(Stroke Metric)  Outcome: Progressing Towards Goal  Goal: *Dysphagia screen performed(Stroke Metric)  Outcome: Progressing Towards Goal  Goal: *Rehab consulted(Stroke Metric)  Outcome: Progressing Towards Goal     Problem: TIA/CVA Stroke: Day 2 Until Discharge  Goal: Off Pathway (Use only if patient is Off Pathway)  Outcome: Progressing Towards Goal  Goal: Activity/Safety  Outcome: Progressing Towards Goal  Goal: Diagnostic Test/Procedures  Outcome: Progressing Towards Goal  Goal: Nutrition/Diet  Outcome: Progressing Towards Goal  Goal: Discharge Planning  Outcome: Progressing Towards Goal  Goal: Medications  Outcome: Progressing Towards Goal  Goal: Respiratory  Outcome: Progressing Towards Goal  Goal: Treatments/Interventions/Procedures  Outcome: Progressing Towards Goal  Goal: Psychosocial  Outcome: Progressing Towards Goal  Goal: *Verbalizes anxiety and depression are reduced or absent  Outcome: Progressing Towards Goal  Goal: *Absence of aspiration  Outcome: Progressing Towards Goal  Goal: *Absence of deep venous thrombosis signs and symptoms(Stroke Metric)  Outcome: Progressing Towards Goal  Goal: *Optimal pain control at patient's stated goal  Outcome: Progressing Towards Goal  Goal: *Tolerating diet  Outcome: Progressing Towards Goal  Goal: *Ability to perform ADLs and demonstrates progressive mobility and function  Outcome: Progressing Towards Goal  Goal: *Stroke education continued(Stroke Metric)  Outcome: Progressing Towards Goal     Problem: Ischemic Stroke: Discharge Outcomes  Goal: *Verbalizes anxiety and depression are reduced or absent  Outcome: Progressing Towards Goal  Goal: *Verbalize understanding of risk factor modification(Stroke Metric)  Outcome: Progressing Towards Goal  Goal: *Hemodynamically stable  Outcome: Progressing Towards Goal  Goal: *Absence of aspiration pneumonia  Outcome: Progressing Towards Goal  Goal: *Aware of needed dietary changes  Outcome: Progressing Towards Goal  Goal: *Verbalize understanding of prescribed medications including anti-coagulants, anti-lipid, and/or anti-platelets(Stroke Metric)  Outcome: Progressing Towards Goal  Goal: *Tolerating diet  Outcome: Progressing Towards Goal  Goal: *Aware of follow-up diagnostics related to anticoagulants  Outcome: Progressing Towards Goal  Goal: *Ability to perform ADLs and demonstrates progressive mobility and function  Outcome: Progressing Towards Goal  Goal: *Absence of DVT(Stroke Metric)  Outcome: Progressing Towards Goal  Goal: *Absence of aspiration  Outcome: Progressing Towards Goal  Goal: *Optimal pain control at patient's stated goal  Outcome: Progressing Towards Goal  Goal: *Home safety concerns addressed  Outcome: Progressing Towards Goal  Goal: *Describes available resources and support systems  Outcome: Progressing Towards Goal  Goal: *Verbalizes understanding of activation of EMS(911) for stroke symptoms(Stroke Metric)  Outcome: Progressing Towards Goal  Goal: *Understands and describes signs and symptoms to report to providers(Stroke Metric)  Outcome: Progressing Towards Goal  Goal: *Neurolgocially stable (absence of additional neurological deficits)  Outcome: Progressing Towards Goal  Goal: *Verbalizes importance of follow-up with primary care physician(Stroke Metric)  Outcome: Progressing Towards Goal  Goal: *Smoking cessation discussed,if applicable(Stroke Metric)  Outcome: Progressing Towards Goal  Goal: *Depression screening completed(Stroke Metric)  Outcome: Progressing Towards Goal     Problem: Patient Education: Go to Patient Education Activity  Goal: Patient/Family Education  Outcome: Progressing Towards Goal

## 2020-08-23 NOTE — PROGRESS NOTES
Strokes workups are reviewed. Patient is stable according to nurse's note. He can be discharged from neurologic standpoint. TTE can be finished outpatient if it is the only barrier. Continue ASA 81 mg daily, I will discontinue lipitor. Smoke cessation is recommended. Patient needs to follow up outpatient after 1-2 months for  hypercoagulability panel. Stroke Work-up:  Brain MRI: Result Date: 8/21/2020  IMPRESSION: Mildly motion degraded study. 1.  No acute infarct, hemorrhage, mass effect, or herniation. 2.  Noncontrast MR brain within normal limits. Ct Head Wo Cont Result Date: 8/21/2020    IMPRESSION: 1. No acute intracranial abnormality demonstrated. CRITICAL RESULT:  CODE S stroke results called to Dr. Chapo Shabazz in the emergency room prior to dictation at 12:32 PM on 8/21/2020. Cta Head Neck W Cont Result Date: 8/22/2020    IMPRESSION: 1. Unremarkable CTA of the head and neck.      Echocardiogram: Pending  Lipid panel:   Lab Results   Component Value Date/Time    Cholesterol, total 145 08/22/2020 05:00 AM    HDL Cholesterol 35 (L) 08/22/2020 05:00 AM    LDL, calculated 80.6 08/22/2020 05:00 AM    VLDL, calculated 29.4 08/22/2020 05:00 AM    Triglyceride 147 08/22/2020 05:00 AM    CHOL/HDL Ratio 4.1 08/22/2020 05:00 AM     HbA1c:   Lab Results   Component Value Date/Time    Hemoglobin A1c 5.6 08/22/2020 05:00 AM

## 2020-08-23 NOTE — DISCHARGE INSTRUCTIONS
DISCHARGE SUMMARY from Nurse    PATIENT INSTRUCTIONS:    After general anesthesia or intravenous sedation, for 24 hours or while taking prescription Narcotics:  · Limit your activities  · Do not drive and operate hazardous machinery  · Do not make important personal or business decisions  · Do  not drink alcoholic beverages  · If you have not urinated within 8 hours after discharge, please contact your surgeon on call. Report the following to your surgeon:  · Excessive pain, swelling, redness or odor of or around the surgical area  · Temperature over 100.5  · Nausea and vomiting lasting longer than 4 hours or if unable to take medications  · Any signs of decreased circulation or nerve impairment to extremity: change in color, persistent  numbness, tingling, coldness or increase pain  · Any questions    What to do at Home:  Recommended activity: Activity as tolerated,     If you experience any of the following symptoms , please follow up with . *  Please give a list of your current medications to your Primary Care Provider. *  Please update this list whenever your medications are discontinued, doses are      changed, or new medications (including over-the-counter products) are added. *  Please carry medication information at all times in case of emergency situations. These are general instructions for a healthy lifestyle:    No smoking/ No tobacco products/ Avoid exposure to second hand smoke  Surgeon General's Warning:  Quitting smoking now greatly reduces serious risk to your health.     Obesity, smoking, and sedentary lifestyle greatly increases your risk for illness    A healthy diet, regular physical exercise & weight monitoring are important for maintaining a healthy lifestyle    You may be retaining fluid if you have a history of heart failure or if you experience any of the following symptoms:  Weight gain of 3 pounds or more overnight or 5 pounds in a week, increased swelling in our hands or feet or shortness of breath while lying flat in bed. Please call your doctor as soon as you notice any of these symptoms; do not wait until your next office visit. The discharge information has been reviewed with the patient. The patient verbalized understanding. Discharge medications reviewed with the patient and appropriate educational materials and side effects teaching were provided. ___________________________________________________________________________________________________________________________________    Patient armband removed and shredded  Patient Education        Accidente isquémico transitorio: Instrucciones de cuidado  Transient Ischemic Attack: Care Instructions  Instrucciones de cuidado    Un accidente isquémico transitorio (AIT) es cuando el flujo de bhargav a ger parte de willson cerebro se obstruye por un período breve. Un AIT es jeanro un ataque cerebral andreas suele durar solo unos minutos. Un AIT no causa daño cerebral permanente. Cualquier problema de visión, habla poco bhumi u otros síntomas suelen desaparecer al cabo de 10 a 20 minutos. Sin embargo, podrían durar hasta 24 horas. Los AIT suelen ser señales de advertencia de un ataque cerebral. Algunas personas que sufren un AIT pueden tener un ataque cerebral en el futuro. Un ataque cerebral puede provocar síntomas similares a los del AIT. Andreas un ataque cerebral causa daño cerebral duradero. Usted puede che medidas para ayudar a prevenir un ataque cerebral. Ger de las cosas que puede hacer es conseguir tratamiento temprano. Si tiene otros síntomas nuevos o si macrina síntomas no mejoran, vuelva a la bo de urgencias o llame a willson médico inmediatamente. Conseguir tratamiento de inmediato puede prevenir el daño cerebral a sawyer plazo causado por un ataque cerebral.  El médico lo oro examinado minuciosamente, andreas pueden aparecer problemas más tarde. Si nota algún problema o nuevos síntomas, busque tratamiento médico de inmediato.   [place] atención de seguimiento es ger parte clave de willson tratamiento y seguridad. Asegúrese de hacer y acudir a todas las citas, y llame a willson médico si está teniendo problemas. También es ger buena idea saber los resultados de macrina exámenes y mantener ger lista de los medicamentos que katalina. ¿Cómo puede cuidarse en el hogar? Medicamentos  · Sea darshana con los medicamentos. [place] macrina medicamentos exactamente jenaro le fueron recetados. Llame a willson médico si klaus que está teniendo un problema con willson medicamento. · Si katalina un medicamento para prevenir la formación de coágulos de [place], [place] aspirina, asegúrese de que recibe instrucciones sobre cómo che willson medicamento de forma black. Maye tipo de medicamentos puede causar problemas de sangrado graves. · Llame a willson médico si no puede che macrina medicamentos por cualquier razón. · No tome ningún medicamento de venta daria o producto herbario sin consultar doug a willson médico.  · Si katalina píldoras anticonceptivas o terapia hormonal, hable con willson médico. Pregunte si estos tratamientos son adecuados para usted. Cambios de estilo de simona  · No fume. Si necesita ayuda para dejar el hábito, hable con willson médico sobre programas y medicamentos para dejar de fumar. · Dionna Shin. Marimar más ejercicio si willson médico se lo recomienda. Caminar es ger buena opción. Poco a poco, aumente la distancia que camina cada día. Trate de caminar un mínimo de 30 minutos ray todos los días de la [place]. También podría querer nadar, montar en bicicleta o realizar otras actividades. · Coma alimentos saludables para el corazón. Estos incluyen frutas, verduras, alimentos ricos en fibra, antwan magras, frijoles, arvejas (chícharos), nueces, semillas y productos de soya, y alimentos bajos en sodio, grasas saturadas y grasas trans. · Manténgase en un peso saludable. Baje de peso si lo necesita. · Limite el alcohol a 2 bebidas al día si es hombre y a 1 bebida al día si es [place].   Manténgase samantha  · Controle otros problemas de rodger jenaro la diabetes, la presión arterial yogesh y el colesterol alto. · Póngase la vacuna contra la gripe todos los [place]. ¿Cuándo debe pedir ayuda? Llame [id number] en cualquier momento que piense que puede requerir atención de [place]. Por ejemplo, llame si:  · Tiene síntomas nuevos o peores de un ataque cerebral. Estos pueden incluir:  ? Entumecimiento, hormigueo, debilitamiento o pérdida de movimiento repentinos en la sanchez, el brazo o la pierna, sobre todo en un solo lado del cuerpo. ? Cambios repentinos en la visión. ? Dificultades repentinas para hablar. ? Confusión repentina o dificultad para comprender frases sencillas. ? Problemas repentinos para caminar o mantener el equilibrio. ? Dolor de [place] intenso y repentino, distinto de los carlos de Reymundo Benitez. Llame al 911 aunque estos síntomas desaparezcan en unos minutos. · Siente jenaro si estuviera teniendo otro AIT. Vigile muy de cerca los cambios en willson rodger, y asegúrese de comunicarse con willson médico si tiene algún problema. ¿Dónde puede encontrar más información en inglés? iVviane Mcallister a [email or web address]  [place] [id number] en la búsqueda para aprender más acerca de \"Accidente isquémico transitorio: Instrucciones de cuidado. \"  Revisado: 4 marzo, 2020               Versión del contenido: 12.5  © 2006-2020 [employer]. Las instrucciones de cuidado fueron adaptadas bajo licencia por [employer] (which disclaims liability or warranty for this information). Si usted tiene [place] afección médica o sobre estas instrucciones, siempre pregunte a willson profesional de rodger. [facility] niega toda garantía o responsabilidad por willson uso de esta información.

## 2020-08-23 NOTE — PROGRESS NOTES
Problem: Falls - Risk of  Goal: *Absence of Falls  Description: Document [facility] Fall Risk and appropriate interventions in the flowsheet.   Outcome: Resolved/Met

## 2020-08-23 NOTE — PROGRESS NOTES
Imer care of the patient from Clemente Yates RN (offgoing Nurse). Patient is alert and oriented. Pt denies any pain or discomfort at this moment. bed in low position, call bell within reach. 1300 This RN went to checked on pt, pt reported he had vision changes for a  Minute an hour before which has resolved. Will notify provider. [phone] Notified DR. Samayoa about above. [id number] Paged Dr. Zeny Ngo to notify about above. Waiting for call back    1516 Paged Dr. Zeny Ngo again. 1810 Discharge instructions reviewed with the patient. Patient verbalized understanding and verified by teach back. All questions answered. IV discontinued, no redness, swelling or pain noted. Patient discharged off the unit via wheelchair. Patient armband removed and shredded.

## 2020-08-23 NOTE — PROGRESS NOTES
1921:  Assumed care for patient, received bedside report from Gabi Quinones RN. Patient quietly lying in bed watching television, with no complaints of pain or discomfort at the time. NIH score is 0. Whiteboard updated, bed at the lowest position with call bell within reach. Shift uneventful. Bedside and Verbal shift change report given to Gabi Quinones RN(oncoming nurse) by Ant Robles RN   (offgoing nurse). Report included the following information SBAR, Kardex, ED Summary, Procedure Summary, Intake/Output, MAR, Recent Results, Med Rec Status, Cardiac Rhythm SB/SR and Alarm Parameters .

## 2020-08-23 NOTE — PROGRESS NOTES
Hospitalist Progress Note    Patient: Bette Birmingham MRN: [id number]  CSN: [id number]    YOB: 1980  Age: 46 y.o. Sex: male    DOA: 8/21/2020 LOS:  LOS: 2 days          Chief Complaint:    Blurred vision and RUE numbness    Assessment/Plan     70-year-old male with a history of nicotine dependence with sudden onset of blurred vision and right arm numbness which resolved after an hour. Admitted for possible TIA and stroke evaluation      Visual disturbance associated with right arm numbness -resolved  Drug evaluation unremarkable  CT head and neck negative  Brain MRI negative  TTE can be obtained outpatient  Neurology following, Dr. Eden Knight can DC today   Continue daily aspirin use 81 mg p.o. daily  Lipitor can be discontinued  Follow-up with Dr. Arcelia Licona in 1 to 2 months    Cocaine use -  Strongly advised patient to never use cocaine again     Nicotine dependence -  Advised immediate cessation     DVT prophylaxis/GI prophylaxis ordered    Disposition : Home   Patient Active Problem List   Diagnosis Code    TIA (transient ischemic attack) G45.9       Subjective:    Uneventful night. No complaints or issues. Ready to go home. Review of systems:    Constitutional: denies fevers, chills, myalgias  Respiratory: denies SOB, cough  Cardiovascular: denies chest pain, palpitations  Gastrointestinal: denies nausea, vomiting, diarrhea      Vital signs/Intake and Output:  Visit Vitals  /65 (BP 1 Location: Left arm, BP Patient Position: At rest)   Pulse (!) 56   Temp 97.7 °F (36.5 °C)   Resp 17   Wt 102.1 kg (225 lb 1.6 oz)   SpO2 99%   BMI 39.87 kg/m²     Current Shift:  08/23 0701 - 08/23 1900  In: 480 [P.O.:480]  Out: -   Last three shifts:  08/21 1901 - 08/23 0700  In: 2763.3 [P.O.:1680;  I.V.:1083.3]  Out: -     Exam:    General: Well developed, alert, NAD, OX3  Head/Neck: NCAT, supple, No masses, No lymphadenopathy  CVS:Regular rate and rhythm, no M/R/G, S1/S2 heard, no thrill  Lungs:Clear to auscultation bilaterally, no wheezes, rhonchi, or rales  Abdomen: Soft, Nontender, No distention, Normal Bowel sounds, No hepatomegaly  Extremities: No C/C/E, pulses palpable 2+  Skin:normal texture and turgor, no rashes, no lesions  Neuro:grossly normal , follows commands  Psych:appropriate                Labs: Results:       Chemistry Recent Labs     08/22/20  1225 08/21/20  1208   * 102*    140   K 4.1 4.2    106   CO2 28 28   BUN 12 18   CREA 0.80 0.98   CA 8.9 9.2   AGAP 5 6   BUCR 15 18   AP 63 64   TP 7.1 7.5   ALB 3.8 4.1   GLOB 3.3 3.4   AGRAT 1.2 1.2      CBC w/Diff Recent Labs     08/23/20  0558 08/22/20  0500 08/21/20  1208   WBC 8.2 8.0 8.8   RBC 5.43 5.25 5.54*   HGB 16.7* 16.2* 17.1*   HCT 50.0* 48.4* 49.3*    174 177   GRANS  --   --  73   LYMPH  --   --  20*   EOS  --   --  2      Cardiac Enzymes Recent Labs     08/21/20  1208      CKND1 1.2      Coagulation No results for input(s): PTP, INR, APTT, INREXT, INREXT in the last 72 hours. Lipid Panel Lab Results   Component Value Date/Time    Cholesterol, total 145 08/22/2020 05:00 AM    HDL Cholesterol 35 (L) 08/22/2020 05:00 AM    LDL, calculated 80.6 08/22/2020 05:00 AM    VLDL, calculated 29.4 08/22/2020 05:00 AM    Triglyceride 147 08/22/2020 05:00 AM    CHOL/HDL Ratio 4.1 08/22/2020 05:00 AM      BNP No results for input(s): BNPP in the last 72 hours.    Liver Enzymes Recent Labs     08/22/20  1225   TP 7.1   ALB 3.8   AP 63      Thyroid Studies No results found for: T4, T3U, TSH, TSHEXT, TSHEXT     Procedures/imaging: see electronic medical records for all procedures/Xrays and details which were not copied into this note but were reviewed prior to creation of Jimena Hsieh MD

## 2020-08-23 NOTE — PROGRESS NOTES
Speech Therapy Note:      11:17AM  On-call SLP acknowledging eval & tx orders. Admitted for possible TIA and stroke evaluation d/t c/o blurred vision and right arm numbness which resolved after an hour. , per chart. No acute infarct, hemorrhage, mass effect, or herniation indicated on MRI. Noncontrast MR brain within normal limits. On call SLP spoke with RNCarmelo via telephone who reports no concerns and tolerating diet. \"He's fine. I don't think he needs an eval. He might d/c today\". SLP will attempt to see pt this evening,  to address orders. 16:38 PM;  Neuro has cleared pt to d/c; Still unknown if pt is d/c'ing. RN reports tolerating regular diet today without any concerns. Stat erval not indicated at this time by on-call SLP . F/T SLP  will f/u tomorrow.      Thank you for this referral,  Suzanna Cadet, [id number] [facility]   Speech-Language Pathologist

## 2020-08-24 LAB
AT III PPP CHRO-ACNC: 114 % (ref 75–135)
PROT C PPP-ACNC: 119 % (ref 73–180)
PROT S ACT/NOR PPP: 126 % (ref 63–140)

## 2020-08-25 LAB
CARDIOLIPIN IGA SER IA-ACNC: <9 APL U/ML (ref 0–11)
CARDIOLIPIN IGG SER IA-ACNC: <9 GPL U/ML (ref 0–14)
CARDIOLIPIN IGM SER IA-ACNC: <9 MPL U/ML (ref 0–12)

## 2020-08-27 ENCOUNTER — APPOINTMENT (OUTPATIENT)
Dept: CT IMAGING | Age: 40
End: 2020-08-27
Attending: EMERGENCY MEDICINE

## 2020-08-27 ENCOUNTER — HOSPITAL ENCOUNTER (EMERGENCY)
Age: 40
Discharge: HOME | End: 2020-08-27
Attending: EMERGENCY MEDICINE

## 2020-08-27 VITALS
SYSTOLIC BLOOD PRESSURE: 124 MMHG | WEIGHT: 225.09 LBS | HEIGHT: 63 IN | RESPIRATION RATE: 12 BRPM | OXYGEN SATURATION: 97 % | TEMPERATURE: 98.1 F | HEART RATE: 62 BPM | BODY MASS INDEX: 39.88 KG/M2 | DIASTOLIC BLOOD PRESSURE: 70 MMHG

## 2020-08-27 DIAGNOSIS — R20.0 NUMBNESS AND TINGLING IN RIGHT HAND: ICD-10-CM

## 2020-08-27 DIAGNOSIS — R20.2 NUMBNESS AND TINGLING IN RIGHT HAND: ICD-10-CM

## 2020-08-27 DIAGNOSIS — R42 LIGHTHEADEDNESS: Primary | ICD-10-CM

## 2020-08-27 LAB
ALBUMIN SERPL-MCNC: 4.1 G/DL (ref 3.4–5)
ALBUMIN/GLOB SERPL: 1.2 {RATIO} (ref 0.8–1.7)
ALP SERPL-CCNC: 74 U/L (ref 45–117)
ALT SERPL-CCNC: 65 U/L (ref 16–61)
ANION GAP SERPL CALC-SCNC: 5 MMOL/L (ref 3–18)
AST SERPL-CCNC: 31 U/L (ref 10–38)
BASOPHILS # BLD: 0 K/UL (ref 0–0.1)
BASOPHILS NFR BLD: 0 % (ref 0–2)
BILIRUB SERPL-MCNC: 0.3 MG/DL (ref 0.2–1)
BUN SERPL-MCNC: 14 MG/DL (ref 7–18)
BUN/CREAT SERPL: 18 (ref 12–20)
CALCIUM SERPL-MCNC: 9.1 MG/DL (ref 8.5–10.1)
CHLORIDE SERPL-SCNC: 105 MMOL/L (ref 100–111)
CK MB CFR SERPL CALC: 1.1 % (ref 0–4)
CK MB SERPL-MCNC: 1.9 NG/ML (ref 5–25)
CK SERPL-CCNC: 176 U/L (ref 39–308)
CO2 SERPL-SCNC: 28 MMOL/L (ref 21–32)
CREAT SERPL-MCNC: 0.8 MG/DL (ref 0.6–1.3)
DIFFERENTIAL METHOD BLD: ABNORMAL
EOSINOPHIL # BLD: 0.2 K/UL (ref 0–0.4)
EOSINOPHIL NFR BLD: 3 % (ref 0–5)
ERYTHROCYTE [DISTWIDTH] IN BLOOD BY AUTOMATED COUNT: 12.5 % (ref 11.6–14.5)
GLOBULIN SER CALC-MCNC: 3.3 G/DL (ref 2–4)
GLUCOSE SERPL-MCNC: 85 MG/DL (ref 74–99)
HCT VFR BLD AUTO: 48.4 % (ref 36–48)
HGB BLD-MCNC: 16.7 G/DL (ref 13–16)
LYMPHOCYTES # BLD: 1.9 K/UL (ref 0.9–3.6)
LYMPHOCYTES NFR BLD: 21 % (ref 21–52)
MAGNESIUM SERPL-MCNC: 2.2 MG/DL (ref 1.6–2.6)
MCH RBC QN AUTO: 31.6 PG (ref 24–34)
MCHC RBC AUTO-ENTMCNC: 34.5 G/DL (ref 31–37)
MCV RBC AUTO: 91.5 FL (ref 74–97)
MONOCYTES # BLD: 0.7 K/UL (ref 0.05–1.2)
MONOCYTES NFR BLD: 8 % (ref 3–10)
NEUTS SEG # BLD: 6.1 K/UL (ref 1.8–8)
NEUTS SEG NFR BLD: 68 % (ref 40–73)
PLATELET # BLD AUTO: 175 K/UL (ref 135–420)
PMV BLD AUTO: 10.2 FL (ref 9.2–11.8)
POTASSIUM SERPL-SCNC: 4 MMOL/L (ref 3.5–5.5)
PROT SERPL-MCNC: 7.4 G/DL (ref 6.4–8.2)
RBC # BLD AUTO: 5.29 M/UL (ref 4.7–5.5)
SODIUM SERPL-SCNC: 138 MMOL/L (ref 136–145)
TROPONIN I SERPL-MCNC: <0.02 NG/ML (ref 0–0.04)
WBC # BLD AUTO: 9 K/UL (ref 4.6–13.2)

## 2020-08-27 PROCEDURE — 85025 COMPLETE CBC W/AUTO DIFF WBC: CPT

## 2020-08-27 PROCEDURE — 80053 COMPREHEN METABOLIC PANEL: CPT

## 2020-08-27 PROCEDURE — 99285 EMERGENCY DEPT VISIT HI MDM: CPT

## 2020-08-27 PROCEDURE — 70450 CT HEAD/BRAIN W/O DYE: CPT

## 2020-08-27 PROCEDURE — 93005 ELECTROCARDIOGRAM TRACING: CPT

## 2020-08-27 PROCEDURE — 82550 ASSAY OF CK (CPK): CPT

## 2020-08-27 PROCEDURE — 83735 ASSAY OF MAGNESIUM: CPT

## 2020-08-27 NOTE — ED TRIAGE NOTES
Patient was seen here last week for a small stroke. Patient states while he was cutting hair and had decreased control of right arm and blurred vision.

## 2020-08-27 NOTE — ED PROVIDER NOTES
EMERGENCY DEPARTMENT HISTORY AND PHYSICAL EXAM    Date: 8/27/2020  Patient Name: Laura Morales    History of Presenting Illness     Chief Complaint   Patient presents with    Dizziness         History Provided By: Patient    Additional History (Context):   Laura Morales is a 45 y.o. male with PMHX nicotine dependence presents to the emergency department C/O episode of right upper extremity numbness, \"loss of coordination\" blurry vision that started while the patient was cutting hair. Patient admitted 6 days ago and admitted for 2 days for similar symptoms. At that time his onset of symptoms was also while the patient was cutting hair. Had an MRI, CTA and CT which showed no acute process. Was seen by neurology who wants to pursue follow-up in 2 weeks. Patient stating that his symptoms are improving. Pt denies chest pain, shortness of breath, abdominal pain, nausea or vomiting, and any other sxs or complaints. PCP: None    Current Outpatient Medications   Medication Sig Dispense Refill    aspirin 81 mg chewable tablet Take 1 Tab by mouth daily. Can get over the counter. 30 Tab 0       Past History     Past Medical History:  History reviewed. No pertinent past medical history. Past Surgical History:  History reviewed. No pertinent surgical history. Family History:  History reviewed. No pertinent family history. Social History:  Social History     Tobacco Use    Smoking status: Current Every Day Smoker     Packs/day: 0.50    Smokeless tobacco: Never Used   Substance Use Topics    Alcohol use: Yes     Alcohol/week: 2.0 standard drinks     Types: 2 Cans of beer per week     Frequency: Never    Drug use: Yes     Types: Cocaine, Marijuana       Allergies:  No Known Allergies      Review of Systems   Review of Systems   Constitutional: Negative for chills and fever. HENT: Negative for congestion, ear pain, sinus pain and sore throat. Eyes: Positive for visual disturbance. Negative for pain. Respiratory: Negative for cough and shortness of breath. Cardiovascular: Negative for chest pain and leg swelling. Gastrointestinal: Negative for abdominal pain, constipation, diarrhea, nausea and vomiting. Genitourinary: Negative for dysuria and hematuria. Musculoskeletal: Negative for back pain and neck pain. Skin: Negative for pallor and rash. Neurological: Positive for light-headedness and numbness. Negative for dizziness, tremors, weakness and headaches. All other systems reviewed and are negative. Physical Exam     Vitals:    08/27/20 1612 08/27/20 1700 08/27/20 1715   BP: 133/66 120/67 115/58   Pulse: 64 (!) 55 (!) 56   Resp: 20 15 10   Temp: 98.1 °F (36.7 °C)     SpO2: 100% 99% 100%   Weight: 102.1 kg (225 lb 1.4 oz)     Height: 5' 3\" (1.6 m)       Physical Exam    Nursing note and vitals reviewed    Constitutional: [address] male, no acute distress  Head: Normocephalic, Atraumatic  Eyes: Pupils are equal, round, and reactive to light, EOMI  Neck: Supple, non-tender  Cardiovascular: Regular rate and rhythm, no murmurs, rubs, or gallops, + 2 radial pulses bilaterally  Chest: Normal work of breathing and chest excursion bilaterally  Lungs: Clear to ausculation bilaterally, no wheezes, no rhonchi  Abdomen: Soft, non tender, non distended, normoactive bowel sounds  Back: No evidence of trauma or deformity  Extremities: No evidence of trauma or deformity, no LE edema.  No streaking erythema, vesicular lesions, ulcerations or bulla  Skin: Warm and dry, normal cap refill  Neuro: Alert and appropriate, CN intact, normal speech, moving all 4 extremities freely and symmetrically  Psychiatric: Normal mood and affect       Diagnostic Study Results     Labs -     Recent Results (from the past 12 hour(s))   EKG, 12 LEAD, INITIAL    Collection Time: 08/27/20  4:22 PM   Result Value Ref Range    Ventricular Rate 63 BPM    Atrial Rate 63 BPM    P-R Interval 150 ms    QRS Duration 90 ms    Q-T Interval 382 ms    QTC Calculation (Bezet) 390 ms    Calculated P Axis 46 degrees    Calculated R Axis 63 degrees    Calculated T Axis 38 degrees    Diagnosis       Normal sinus rhythm  Cannot rule out Anterior infarct , age undetermined  Abnormal ECG     CBC WITH AUTOMATED DIFF    Collection Time: 08/27/20  5:06 PM   Result Value Ref Range    WBC 9.0 4.6 - 13.2 K/uL    RBC 5.29 4.70 - 5.50 M/uL    HGB 16.7 (H) 13.0 - 16.0 g/dL    HCT 48.4 (H) 36.0 - 48.0 %    MCV 91.5 74.0 - 97.0 FL    MCH 31.6 24.0 - 34.0 PG    MCHC 34.5 31.0 - 37.0 g/dL    RDW 12.5 11.6 - 14.5 %    PLATELET 791 078 - 926 K/uL    MPV 10.2 9.2 - 11.8 FL    NEUTROPHILS 68 40 - 73 %    LYMPHOCYTES 21 21 - 52 %    MONOCYTES 8 3 - 10 %    EOSINOPHILS 3 0 - 5 %    BASOPHILS 0 0 - 2 %    ABS. NEUTROPHILS 6.1 1.8 - 8.0 K/UL    ABS. LYMPHOCYTES 1.9 0.9 - 3.6 K/UL    ABS. MONOCYTES 0.7 0.05 - 1.2 K/UL    ABS. EOSINOPHILS 0.2 0.0 - 0.4 K/UL    ABS. BASOPHILS 0.0 0.0 - 0.1 K/UL    DF AUTOMATED     METABOLIC PANEL, COMPREHENSIVE    Collection Time: 08/27/20  5:06 PM   Result Value Ref Range    Sodium 138 136 - 145 mmol/L    Potassium 4.0 3.5 - 5.5 mmol/L    Chloride 105 100 - 111 mmol/L    CO2 28 21 - 32 mmol/L    Anion gap 5 3.0 - 18 mmol/L    Glucose 85 74 - 99 mg/dL    BUN 14 7.0 - 18 MG/DL    Creatinine 0.80 0.6 - 1.3 MG/DL    BUN/Creatinine ratio 18 12 - 20      GFR est AA >60 >60 ml/min/1.73m2    GFR est non-AA >60 >60 ml/min/1.73m2    Calcium 9.1 8.5 - 10.1 MG/DL    Bilirubin, total 0.3 0.2 - 1.0 MG/DL    ALT (SGPT) 65 (H) 16 - 61 U/L    AST (SGOT) 31 10 - 38 U/L    Alk.  phosphatase 74 45 - 117 U/L    Protein, total 7.4 6.4 - 8.2 g/dL    Albumin 4.1 3.4 - 5.0 g/dL    Globulin 3.3 2.0 - 4.0 g/dL    A-G Ratio 1.2 0.8 - 1.7     MAGNESIUM    Collection Time: 08/27/20  5:06 PM   Result Value Ref Range    Magnesium 2.2 1.6 - 2.6 mg/dL   CARDIAC PANEL,(CK, CKMB & TROPONIN)    Collection Time: 08/27/20  5:06 PM   Result Value Ref Range    CK - MB 1.9 <3.6 ng/ml CK-MB Index 1.1 0.0 - 4.0 %     39 - 308 U/L    Troponin-I, QT <0.02 0.0 - 0.045 NG/ML       Radiologic Studies -   CT HEAD WO CONT   Final Result   IMPRESSION:      No acute intracranial abnormality. CT Results  (Last 48 hours)               08/27/20 1646  CT HEAD WO CONT Final result    Impression:  IMPRESSION:       No acute intracranial abnormality. Narrative:  EXAM: CT head       INDICATION: Right upper extremity heaviness. Lightheadedness. Blurry vision. COMPARISON: 8/21/2020       TECHNIQUE: Axial CT imaging of the head was performed without intravenous   contrast. Standard multiplanar coronal and sagittal reformatted images were   obtained and are included in interpretation. One or more dose reduction techniques were used on this CT: automated exposure   control, adjustment of the mAs and/or kVp according to patient size, and   iterative reconstruction techniques. The specific techniques used on this CT   exam have been documented in the patient's electronic medical record. Digital   Imaging and Communications in Medicine (DICOM) format image data are available   to nonaffiliated external healthcare facilities or entities on a secure, media   free, reciprocally searchable basis with patient authorization for at least a   12-month period after this study. _______________       FINDINGS:       BRAIN AND POSTERIOR FOSSA: The sulci, folia, ventricles and basal cisterns are   within normal limits for the patient?s age. There is no intracranial hemorrhage,   mass effect, or midline shift. There are no areas of abnormal parenchymal   attenuation. EXTRA-AXIAL SPACES AND MENINGES: There are no abnormal extra-axial fluid   collections. CALVARIUM: Intact. SINUSES: Clear. OTHER: None.       _______________               CXR Results  (Last 48 hours)    None            Medical Decision Making   I am the first provider for this patient.     I reviewed the vital signs, available nursing notes, past medical history, past surgical history, family history and social history. Vital Signs-Reviewed the patient's vital signs. Pulse Oximetry Analysis -100 % on room air    Cardiac Monitor:  Rate: 64 bpm  Rhythm: Regular    EKG interpretation: (Preliminary)  4:21 PM   Normal sinus rhythm at 63 beats minute. QTc 390 ms. MD interval 150 ms. QRS 90 ms. No acute ST elevation    Records Reviewed: Nursing Notes and Old Medical Records    Provider Notes:   45 y.o. male with a history of nicotine dependence presenting with an episode of right upper extremity numbness, blurry vision. On exam patient does not have any focal neurological deficits. He has no dysarthria, no facial droop, he is normotensive, not tachycardic. He does not appear acutely ill or toxic. Patient with subjective symptoms, however no objective deficits. Will consult neurology as patient had full stroke work-up less than 6 days ago. Procedures:  Procedures    ED Course:   4:21 PM   Initial assessment performed. The patients presenting problems have been discussed, and they are in agreement with the care plan formulated and outlined with them. I have encouraged them to ask questions as they arise throughout their visit. SMOKING CESSATION:  The patient was counseled on the dangers of tobacco use, and was advised to quit. Reviewed strategies to maximize success, including removing cigarettes and smoking materials from environment. Discussion took 3-5 minutes, and pt expressed understanding. 4:36 PM Discussed patient's history, exam, and available diagnostics results with Belia Villavicencio MD (Neurology), who agrees with CT of the head. If pt's sx resolves, no indication for MRI.     5:48 PM  CT scan showing no acute process. Hemoglobin stable at 16.7. No leukocytosis or bandemia. No metabolic derangements. On reassessment patient reports complete resolution of symptoms.   Unclear etiology however not consistent with acute stroke. No indication for MRI at this time. Urged close follow-up with Dr. Marylu Cowden. Diagnosis and Disposition       DISCHARGE NOTE:  0:40 PM    David Dy  results have been reviewed with him. He has been counseled regarding his diagnosis, treatment, and plan. He verbally conveys understanding and agreement of the signs, symptoms, diagnosis, treatment and prognosis and additionally agrees to follow up as discussed. He also agrees with the care-plan and conveys that all of his questions have been answered. I have also provided discharge instructions for him that include: educational information regarding their diagnosis and treatment, and list of reasons why they would want to return to the ED prior to their follow-up appointment, should his condition change. He has been provided with education for proper emergency department utilization. CLINICAL IMPRESSION:    1. Lightheadedness    2. Numbness and tingling in right hand        PLAN:  1. D/C Home  2. Current Discharge Medication List        3. Follow-up Information     Follow up With Specialties Details Why Contact Info    [address] [place]  Schedule an appointment as soon as possible for a visit in 2 days  [address] [facility],[address]    Walter Navarrete MD Neurology Schedule an appointment as soon as possible for a visit in 2 days  [address]  [place] [address] [id number] [phone]      [facility] EMERGENCY DEPT Emergency Medicine  As needed if symptoms worsen [address] [address]  [phone]        ____________________________________     Please note that this dictation was completed with [employer] the [employer] voice recognition software. Quite often unanticipated grammatical, syntax, homophones, and other interpretive errors are inadvertently transcribed by the computer software. Please disregard these errors.   Please excuse any errors that have escaped final proofreading.

## 2020-08-27 NOTE — DISCHARGE INSTRUCTIONS
You were seen and evaluated in the Emergency Department. Please understand that your work up is not all encompassing and you should follow up with your primary care physician for further management and continuity of care. Please return to Emergency Department or seek medical attention immediately if you have acute worsening in your symptoms or develop chest pain, shortness of breath, repeated vomiting, fever, altered level of consciousness, coughing up blood, or start sweating and feel clammy. If you were prescribed any medicine for home, please take as prescribed by your health-care provider. If you were given any follow-up appointments or numbers to call, please do so as instructed. Avoid any tobacco products or excessive alcohol. Patient Education        Mareos: Instrucciones de cuidado  Dizziness: Care Instructions  Instrucciones de cuidado  Los mareos son Bonnie Drilling sensación de inestabilidad o confusión en la new. Son distintos al vértigo, ger sensación de que la habitación gira o de que usted se mueve o . También es distinto del aturdimiento, que es la sensación de que está a punto de desmayarse. Puede resultar difícil conocer la causa de los [place]. Algunas personas se sienten mareadas cuando tienen migrañas. A veces, los episodios gripales pueden hacer que se sienta mareado. Algunas afecciones médicas, jenaro los problemas cardíacos o la presión arterial yogesh, pueden hacer que se sienta mareado. Muchos medicamentos pueden causar mareos, jenaro los que se usan para la presión arterial yogesh, el dolor o la ansiedad. Si es un medicamento el que está causando los síntomas, willson médico podría recomendarle que lo cambie o deje de tomarlo. Si es un problema cardíaco, podría necesitar medicamentos para ayudar a que willson corazón funcione mejor. Si no hay razón aparente para los síntomas, willson médico podría sugerir vigilar y esperar jesús un tiempo para denis si los mareos desaparecen por sí solos.   [address] seguimiento es ger parte clave de willson tratamiento y seguridad. Asegúrese de hacer y acudir a todas las citas, y llame a willson médico si está teniendo problemas. También es ger buena idea saber los resultados de macrina exámenes y mantener ger lista de los medicamentos que katalina. ¿Cómo puede cuidarse en el hogar? · Si willson médico le recomienda o receta medicamentos, tómelos exactamente según las indicaciones. Llame a willson médico si klaus estar teniendo un problema con willson medicamento. · No conduzca mientras se sienta mareado. · Trate de prevenir las caídas. Algunas medidas que puede che son:  ? Usar tapetes antideslizantes, agregar agarraderas cerca de la trisha y usar luces nocturnas. ? Ordenar willson casa de robb manera que en los senderos no haya nada con lo que se pueda tropezar. ? Avisarles a familiares y [facility] que se ha estado sintiendo [place]. [place] les servirá para saber cómo ayudarle. ¿Cuándo debe pedir ayuda? Llame [id number] en cualquier momento que considere que necesita atención de [place]. Por ejemplo, llame si:  · Se desmayó (perdió el conocimiento). · Tiene mareos junto con síntomas de un ataque cardíaco. Estos pueden incluir:  ? Dolor de [place], o presión o ger sensación extraña en el pecho.  ? Sudoración. ? Falta de aire. ? Náuseas o vómito. ? Dolor, presión, o ger sensación extraña en la espalda, el blanquita, la mandíbula o el abdomen superior, o en bettie o ambos hombros o brazos. ? Aturdimiento o debilidad repentina. ? Un latido cardíaco rápido o irregular. · Tiene síntomas de un ataque cerebral. Estos pueden incluir:  ? Entumecimiento, hormigueo, debilidad o parálisis repentinos en la sanchez, el brazo o la pierna, sobre todo si ocurre en un solo lado del cuerpo. ? Cambios súbitos en la vista. ? Problemas repentinos para hablar. ? Confusión súbita o dificultad repentina para comprender frases sencillas. ? Problemas repentinos para caminar o mantener el equilibrio. ?  Un dolor de new intenso y repentino, distinto a los carlos de new anteriores. Llame a willson médico ahora mismo o busque atención médica inmediata si:  · Se siente mareado y [place], dolor de [place] o zumbido [employer] oídos. · Tiene nuevas náuseas y vómito o [address] · Macrina mareos no desaparecen o regresan. Preste especial atención a los cambios en willson rodger y asegúrese de comunicarse con willson médico si:  · No mejora jenaro se esperaba. ¿Dónde puede encontrar más información en ing[facility]ode [facility]? Sarah a [email or web address]  Octavio [id number] en la búsqueda para aprender más acerca de \"Mareos: Instrucciones de cuidado. \"  Revisado: 26 junio, 3176               VJNRFTU del contenido: 12.5  © 3955-3580 Healthwise, Incorporated. Las instrucciones de cuidado fueron adaptadas bajo licencia por [employer] (which disclaims liability or warranty for this information). Si usted tiene [place] afección médica o sobre estas instrucciones, siempre pregunte a willson profesional de rodger. Healthwise, Incorporated niega toda garantía o responsabilidad por willson uso de esta información. Patient Education        Entumecimiento y hormigueo: Instrucciones de cuidado  Numbness and Tingling: Care Instructions  Instrucciones de cuidado    Muchas cosas pueden causar entumecimiento u hormigueo. Ger hinchazón puede ejercer presión [employer] [place] podría causarle pérdida de sensación o que tenga ger sensación de hormigueo en parte de willson cuerpo. Los nervios pueden ser dañados por traumatismos, toxinas o enfermedades jenaro diabetes o esclerosis múltiple (MS, por macrina siglas en inglés). Sin embargo, algunas veces la causa no está bhumi. Si no hay razón aparente para los síntomas, y no tiene ningún otro síntoma, willson médico podría sugerir vigilar y esperar jesús un tiempo para denis si el entumecimiento o el hormigueo desaparecen por sí solos.  Es posible que willson médico quiera que usted se deirdre análisis de bhargav o pruebas de los nervios para encontrar la causa de macrina síntomas. La atención de seguimiento es ger parte clave de willson tratamiento y seguridad. Asegúrese de hacer y acudir a todas las citas, y llame a willson médico si está teniendo problemas. También es ger buena idea saber los resultados de macrina exámenes y mantener ger lista de los medicamentos que katalina. ¿Cómo puede cuidarse en el hogar? · Si willson médico le receta medicamentos, tómelos exactamente según las indicaciones. Llame a willson médico si klaus estar teniendo un problema con macrina medicamentos. · Si tiene alguna hinchazón, colóquese hielo o ger compresa fría en la wilmer de 10 a 20 minutos por vez. Póngase un paño méndez entre el hielo y la piel. ¿Cuándo debe pedir ayuda? Llame [id number] en cualquier momento que considere que necesita atención de [place]. Por ejemplo, llame si:  · Tiene debilidad, entumecimiento u hormigueo en ambas piernas. · Pierde el control de los intestinos o de la vejiga. · Tiene síntomas de un ataque cerebral. Estos pueden incluir:  ? Entumecimiento, hormigueo, debilidad o parálisis repentinos en la sanchez, el brazo o la pierna, sobre todo si ocurre en un solo lado del cuerpo. ? Cambios súbitos en la vista. ? Problemas repentinos para hablar. ? Confusión súbita o dificultad repentina para comprender frases sencillas. ? Problemas repentinos para caminar o mantener el equilibrio. ? Un dolor de new intenso y repentino, distinto a los carlos de [place]. Preste especial atención a los cambios en willson rodger y asegúrese de comunicarse con willson médico si tiene cualquier problema, o si:  · No mejora jenaro se esperaba. ¿Dónde puede encontrar más información en inglés? Vaya a [email or web address]  Octavio [id number] en la búsqueda para aprender más acerca de \"Entumecimiento y hormigueo: Instrucciones de cuidado. \"  Revisado: 20 novshlia, [facility]               [facility] ClearlakeLEATHA del contenido: 12.5  © 1291-5850 Healthwise, Incorporated.    Las instrucciones de cuidado fueron adaptadas bajo licencia por [employer] (which disclaims liability or warranty for this information). Si usted tiene [place] afección médica o sobre estas instrucciones, siempre pregunte a willson profesional de rodger. [facility] niega toda garantía o responsabilidad por willson uso de esta información.

## 2020-08-28 LAB — F5 GENE MUT ANL BLD/T: NORMAL

## 2020-08-29 LAB
ATRIAL RATE: 63 BPM
CALCULATED P AXIS, ECG09: 46 DEGREES
CALCULATED R AXIS, ECG10: 63 DEGREES
CALCULATED T AXIS, ECG11: 38 DEGREES
DIAGNOSIS, 93000: NORMAL
P-R INTERVAL, ECG05: 150 MS
Q-T INTERVAL, ECG07: 382 MS
QRS DURATION, ECG06: 90 MS
QTC CALCULATION (BEZET), ECG08: 390 MS
VENTRICULAR RATE, ECG03: 63 BPM